# Patient Record
Sex: FEMALE | Race: BLACK OR AFRICAN AMERICAN | Employment: OTHER | URBAN - METROPOLITAN AREA
[De-identification: names, ages, dates, MRNs, and addresses within clinical notes are randomized per-mention and may not be internally consistent; named-entity substitution may affect disease eponyms.]

---

## 2019-07-01 PROBLEM — M54.6 ACUTE MIDLINE THORACIC BACK PAIN: Status: ACTIVE | Noted: 2017-11-13

## 2019-07-01 PROBLEM — F33.1 MODERATE EPISODE OF RECURRENT MAJOR DEPRESSIVE DISORDER (HCC): Status: ACTIVE | Noted: 2017-01-24

## 2019-07-01 PROBLEM — I95.0 IDIOPATHIC HYPOTENSION: Status: ACTIVE | Noted: 2017-01-24

## 2019-07-01 PROBLEM — K21.9 GASTROESOPHAGEAL REFLUX DISEASE WITHOUT ESOPHAGITIS: Status: ACTIVE | Noted: 2017-01-24

## 2019-07-01 PROBLEM — E78.00 HIGH BLOOD CHOLESTEROL: Status: ACTIVE | Noted: 2017-01-24

## 2019-07-01 PROBLEM — R42 INTERMITTENT LIGHTHEADEDNESS: Status: ACTIVE | Noted: 2017-11-13

## 2021-08-12 PROBLEM — H53.8 OTHER VISUAL DISTURBANCES: Status: ACTIVE | Noted: 2021-08-12

## 2022-03-31 PROBLEM — E87.20 METABOLIC ACIDOSIS: Status: ACTIVE | Noted: 2021-10-03

## 2022-03-31 PROBLEM — D50.0 IRON DEFICIENCY ANEMIA DUE TO CHRONIC BLOOD LOSS: Status: ACTIVE | Noted: 2022-03-31

## 2022-03-31 PROBLEM — R13.12 OROPHARYNGEAL DYSPHAGIA: Status: ACTIVE | Noted: 2022-03-31

## 2022-03-31 PROBLEM — R55 SYNCOPE: Status: ACTIVE | Noted: 2021-10-03

## 2022-03-31 PROBLEM — T73.0XXA STARVATION KETOACIDOSIS: Status: ACTIVE | Noted: 2021-10-03

## 2022-03-31 PROBLEM — K31.84 GASTROPARESIS: Status: ACTIVE | Noted: 2022-03-31

## 2022-03-31 PROBLEM — K59.00 CONSTIPATION: Status: ACTIVE | Noted: 2022-03-31

## 2022-03-31 PROBLEM — R42 ORTHOSTATIC DIZZINESS: Status: ACTIVE | Noted: 2017-11-13

## 2022-03-31 PROBLEM — D50.9 IRON DEFICIENCY ANEMIA: Status: ACTIVE | Noted: 2022-03-31

## 2022-03-31 PROBLEM — R14.3 FLATULENCE, ERUCTATION AND GAS PAIN: Status: ACTIVE | Noted: 2022-03-31

## 2022-03-31 PROBLEM — E87.29 STARVATION KETOACIDOSIS: Status: ACTIVE | Noted: 2021-10-03

## 2022-03-31 PROBLEM — R55 NEAR SYNCOPE: Status: ACTIVE | Noted: 2021-10-03

## 2022-03-31 PROBLEM — K57.30 DIVERTICULAR DISEASE OF COLON: Status: ACTIVE | Noted: 2022-03-31

## 2022-03-31 PROBLEM — R14.1 FLATULENCE, ERUCTATION AND GAS PAIN: Status: ACTIVE | Noted: 2022-03-31

## 2022-03-31 PROBLEM — R14.2 FLATULENCE, ERUCTATION AND GAS PAIN: Status: ACTIVE | Noted: 2022-03-31

## 2022-04-20 ENCOUNTER — HOSPITAL ENCOUNTER (OUTPATIENT)
Dept: PHYSICAL THERAPY | Age: 42
Discharge: HOME | End: 2022-04-20
Payer: MEDICARE

## 2022-04-20 PROCEDURE — 97162 PT EVAL MOD COMPLEX 30 MIN: CPT

## 2022-04-20 NOTE — PROGRESS NOTES
[facility] PHYSICAL THERAPY  [address] [place] [address] Mary Parrish, [address] - Phone: [phone]  Fax: [phone] / [facility]  Patient Name: Wing Cox : 1980   Medical   Diagnosis: Gait instability [R26.81]  Multiple sclerosis [G35] Treatment Diagnosis: MS   Onset Date: chronic     Referral Source: Matthew Miller NP Start of Care [facility] [place]): 2022   Prior Hospitalization: See medical history Provider #: [id number]   Prior Level of Function: Ambulating with SPC   Comorbidities: anxiety, depression, back pain, IBS, MS, insomnia, migraines   Medications: Verified on Patient Summary List   The Plan of Care and following information is based on the information from the initial evaluation.   ===========================================================================================  Assessment / key information:  Patient is a 35y.o. year old female with chief complaint of decreased functional mobility due to weakness, fatigue, imbalance, and lightheadedness. Patient reports no recent falls, but she does report \"skipping steps\" while walking (indicates side stepping and decreased stride). Objective findings: 1) Functional Gait Assessment (FGA) not assessed today due to time constraints (this will be completed next visit), 2) increased base of support noted during gait with SPC, 3) decreased strength in B LE's (primarily hip musculature), 4) Five Times Sit to Stand test = 19\", indicating decreased functional mobility, 5) gait speed = 1.0 m/s (WNL). Patient will benefit from skilled PT services to address these issues. Thank you for your referral.    Hayes Leong (Focused on Therapeutic Outcomes) Functional Status score = 51/100, which corresponds to a functional limitation of 49%.     Strength (MMT):  Shoulder L (0-5) R (0-5)   Shoulder Flexion 4 4   Shoulder Abduction 4 4   Shoulder IR 5 5   Shoulder ER 4 4 Elbow flexion 4 4   Elbow extension 4 4                                             Hip L (0-5) R (0-5)   Hip Flexion 5 5   Hip Ext 3+ 3   Hip ABD 3 3   Hip ADD       Hip ER       Hip IR          Knee L (0-5) R (0-5)   Knee Flexion 5 5   Knee Extension 5 5       ===========================================================================================  Eval Complexity: History: HIGH Complexity :3+ comorbidities / personal factors will impact the outcome/ POC Exam:MEDIUM Complexity : 3 Standardized tests and measures addressing body structure, function, activity limitation and / or participation in recreation  Presentation: MEDIUM Complexity : Evolving with changing characteristics  Clinical Decision Making:MEDIUM Complexity : FOTO score of 26-74Overall Complexity:MEDIUM    Problem List: pain affecting function, decrease strength, impaired gait/ balance, decrease ADL/ functional abilitiies, decrease activity tolerance, decrease flexibility/ joint mobility and decrease transfer abilities   Treatment Plan may include any combination of the following: Therapeutic exercise, Therapeutic activities, Neuromuscular re-education, Physical agent/modality, Gait/balance training, Manual therapy and Patient education  Patient / Family readiness to learn indicated by: asking questions, trying to perform skills and interest  Persons(s) to be included in education: patient (P) and family support person (FSP);list mother  Barriers to Learning/Limitations: None  Measures taken:    Patient Goal (s): \"get better\"   Patient self reported health status: fair  Rehabilitation Potential: good   Short Term Goals: To be accomplished in  4  weeks:  1. Patient will be compliant with home exercise program.  2.  Complete FGA to assess fall risk. 3.  Complete Dodson Balance Scale to assess fall risk.  Long Term Goals: To be accomplished in  8  weeks:  1.   Patient will be independent with home exercise program.  2.  Increase FGA score by 4 points, if applicable, to decrease fall risk. 3.  Increase Dodson score by 6 points, if applicable, to decrease fall risk. 4.  Patient will increase FOTO Functional Status score to > 54/100 to indicate decreased functional limitations. Frequency / Duration:   Patient to be seen  2-3  times per week for 8  weeks:  Patient / Caregiver education and instruction: self care    Therapist Signature: Bry Kemp PT Date: 5/23/9477   Certification Period: 4/20/2022 - 7/19/2022 Time: 6:31 PM   ===========================================================================================  I certify that the above Physical Therapy Services are being furnished while the patient is under my care. I agree with the treatment plan and certify that this therapy is necessary. Physician Signature:        Date:       Time:                                         Kristin Marks NP    Please sign and return to [facility] or you may fax the signed copy to [phone]. Thank you. To ensure your patient receives the highest quality care and to avoid disruption in therapy please sign and return this plan of care within 21 days. Per Medicaid guidelines if the plan of care is not received within 21 days the patient's care must be put on hold until signed.

## 2022-05-02 ENCOUNTER — HOSPITAL ENCOUNTER (OUTPATIENT)
Dept: PHYSICAL THERAPY | Age: 42
Discharge: HOME | End: 2022-05-02
Payer: MEDICARE

## 2022-05-02 PROCEDURE — 92610 EVALUATE SWALLOWING FUNCTION: CPT

## 2022-05-02 PROCEDURE — 97530 THERAPEUTIC ACTIVITIES: CPT

## 2022-05-02 PROCEDURE — 97116 GAIT TRAINING THERAPY: CPT

## 2022-05-02 PROCEDURE — 97112 NEUROMUSCULAR REEDUCATION: CPT

## 2022-05-02 NOTE — PROGRESS NOTES
ST DAILY TREATMENT NOTE    Patient Name: Araceli Garibay  Date:2022  : 1980  [x]  Patient  Verified  Payor: Sara Muirward / Plan: [place] / Product Type: Managed Care Medicare /   In time: 2:05  Out time: 2:45  Total Treatment Time (min): 40  Visit #: 1 of 8    SUBJECTIVE  Pain Level (0-10 scale): 0  Any medication changes, allergies to medications, adverse drug reactions, diagnosis change, or new procedure performed?: [x] No    [] Yes (see summary sheet for update)  Subjective functional status/changes:   [] No changes reported  This 34 y/o female was referred to OP ST d/t reported \"difficulty chewing\" with a known hx of MS since the age of 14. . Pt denies coughing with meals or any recent PNAs. She does reports difficulty chewing (more dominant with L side)  with both sides of her mouth. She further reports occasionally finding pocketed food within her oral cavity when brushing her teeth. She exhibits notable cognitive deficits and slurred/rapid speech in conversation. Date of Onset: referred 22  Social History:  ; mother of a son; resides with her mother who is her caregiver. Prior Functional level: All aspects of speech/ language, cognition, voice and swallowing WNLs, per pt report  Radiology: MBS 16 which revealed oral pharyngeal swallowing skills were [place]/[facility]. Mildly delayed swallow initiation with thin liquids and mixed consistency reaching pyriforms prior to swallow initiation. No laryngeal penetration, aspiration, or pharyngeal residual across trials     MBS 2020: which revealed oral pharyngeal swallowing skills were WNL. Timely swallow initiation with adequate tongue base retraction, complete epiglottic inversion, and sufficient hyolaryngeal elevation. No aspiration or penetration was observed with trials of thin liquid, puree, soft solid and solid consistencies during this MBSS.      Current diet: Regular with thin liquids   positioning: 90*    Mental Status:  [x]alert []lethargic [x]confused  Orientation: [x]person [x]place []time []situation  AC Directions: [x]1-step [x]2-step []3-step []complex  Motivation: []excellent [x]good  []fair  []poor   Barriers to learning: []none []aphasia [x]? cognition []lethargy/motivation [][place]   []?vision []language [x]Fatigue/pain []psych factors compensate with:   Dentition: [x]normal []abnormal []edentulous []dentures   Respiratory Status: [x]WFL []SOB  []O2 L/min:  []NC []Mask               []Trach Tube:                     []Excess secretions  Lips:  [x]Symmetrical []asymmetrical  Retraction [x]WFL  []?min []?mod []?max  Protrusion [x]WFL  []?min []?mod []?max  Strength [x]WFL  []?min []?mod []?max  Puff  []WFL  [x]?min []?mod []?max  minimallu fasiculations    Tongue:  []Symmetrical [x]asymmetrical  Protrusion []WFL  [x]?min [x]?mod []?max  Deviates to the R with fasicualtions in tongue and upper lip  Elevation []WFL  [x]?min [x]?mod []?max  Depression [x]WFL  []?min []?mod []?max  Lateralization [x]WFL  []?min []?mod []?max  Strength []WFL  [x]?min [x]?mod []?max    Velum:  []Symmetrical []asymmetrical    Gag Reflex:  [x]Present []absent []DNT    Sensation:  [x]Intact []Diminished  Specify: n/a    Voice:  []Normal []Hoarse []harsh  [x]Breathy []Hypernasal []hyponasal  [][place] Other: perceptually soft. Swallow:  [x]Volitional []absent  [x]Reflexive []absent  Cough   Strength : []WNL []Diminished  [x]Volitional []absent  []Reflexive []absent  Did not witness  OBJECTIVE    OP SWALLOW EVALUATION    Observation of Swallow:  Thin Nectar Honey Puree  applesauce Solids  Mechanical soft fruit and grain bar/regular peanut butter cracker nabs Other  Mixed consistency canned fruit cocktail   Oral Phase  n/a n/a   n/a   Anterior loss of bolus  (decreased labial seal [])         Decreased bolus formation  (?lingual ROM/Coord[x]) X    X    Increased mastication     X    Increased oral transit time  (?A-P bolus transit[x])    X X    Abnormal chewing     X    Tongue pumping/tremors         Pocketing  (?labial/buccal tension/lingual control)     Reported, but not witnessed    Other         Oral Pharyngeal Phase         Delayed swallow initiation         Absent swallow         Stasis on lingual surface  (?lingual movement)         Adherence to hard palate   (? lingual elevation)         Pharyngeal Phase         Multiple swallows  (residue in pharynx [])         Coughing post swallow         Throat clear post swallow         Wet vocal quality  (residue on vocal cords [])         Reduced laryngeal elevation         Nasal Regurgitation  (? velopharyngeal closure)         Other           [] No symptoms of dysphagia evidenced  [x] Symptoms of dysphagia observed  [x] Patient at risk for aspiration  [] Other:      Today, SLP conducted a Clinical Beside Swallow Evaluation, per MD orders. Pt A&Ox2-3 loosely and able to follow commands. Oral [facility] examination revealed structures functional for speech and deglutition, but with noted reduced lingual strength and coordination. Pt observed with thin liquids +/- straw via single sips and successive swallows with timely swallow initiation, adequate laryngeal elevation to palpation and no overt s/sx aspiration; pt reports she prefers drinking from a straw as it makes her \"less likely to choke\". Pt demo'd acceptance of puree with slow, but functional oral prep. Abnormal masticatory pattern observed with L sided dominance and extended time warranted for A-P transit. Pt given min cues to [facility] and with her entire mouth. No apparent oral residuals post swallow, however, pt reported recently finding food within her mouth when brushing her teeth. No overt s/sx of aspiration witnessed. Pt further witnessed accepting her 1 pm medications that she reported she forgot to take earlier with slow oral manipulation and A-P transi, but 0 s/sx of aspiration.  Pt reported recent weight loss of ~ 18 lbs, but unable to recall within what timeframe. Impression:   Pt exhibits a mild oral dysphagia, putting her at risk for aspiration. Additionally, noted underlying cognitive-linguistic deficits and motor speech deficit. Would further rec a speech evaluation. SLP RECS:  Regular solid diet (as tolerated) with thin liquids, meds as tolerated,  with oral care TID and standard aspiration precautions. ST will continue to follow. Pt educated with regard to s/sx aspiration, aspiration risk, comp strategies, diet recs and role of SLP. Handout provided.   Pt agreeable    Recommendations:  Diet:  [] NPO    [] Pureed [] Ground [] MechSoft [x] Regular (as tolerated   Liquids: [x] Thin  [] Nectar  [] Honeythick  [] Pudding  Presentation: [x] Cup    [] Spoon [x] Straw (preferred) [x] Alternate liquids and solids  Monitor: [x] Sitting up at 90 deg [] Reclined to:  [] Head turned to:    [] Chin tuck  [] Head tilt to:      [x] Seated upright post meals (min): 30-45  Document: [x] Coughing [x] Temperatures [] Lung Sounds    Videoflouroscopy: [] Yes [x] No  Dysphagia Treatment: [x] Yes [] No Sessions per week: 1-2x  Other: Rec speech/cognitive eval     Remediation Techniques:  C = Compensatory techniques to use during meal      F = Facilitation/treatment techniques by SLP    [] Supraglottic Swallow (c,f)    [x] Oral motor exercises (f)  [] Super-supraglottic swallow (c,f)   [x] Labial closure  [x] Compensations for pocketing (c)   [x] Lingual elevation  [x] Sweep mouth with tongue    [] Lingual lateralization  [] Sweep mouth with finger    [x] Lingual anterior-posterior  [] External pressure to check   [x] Lingual base of tongue  [x] Rinse mouth/expel after meal   [] Vocal Fold Exercises (f)  [x] Alternate  Solid::liquid swallows every 2-3 bites (c)    [] Falsetto/laryngeal elevation exercises (f)  [] Discourage liquid wash between bites (c)  [] Thermal application (c,f)  [] Multiple swallows     [] Sour bolus (f)  [x] Patient needs cues     [] Cold bolus (f)  [] Patient does not need cues   [] Pharyngeal exercise (f)  [] Arneta Kales (c,f)    [] Breath hold  [] Encourage/stimulate lip closure (c)   [x] Effortful Swallow (c,f)  [] Empty mouth before next bite (c)   [x] Tongue base retraction  [] Cue patient to slow down (c)    [x] Tongue hold  [] Encourage coughing (c)    [] Laryngeal closure  []Chin tuck (c)  []Head turn(c)  []Head turn, chin tuck (c)    Patient/Caregiver instruction/education: [x] Review HEP    [] Progressed/Changed    HEP/Handouts given: Diet recs and comp strategies    Pain Level (0-10 scale) post treatment: 0    ASSESSMENT  [x]  See Plan of Care     PLAN  [x]  Upgrade activities as tolerated     [x]  Continue plan of care  []  Discharge due to:__  [x] Other: teaching session provided.      Khanh Oconnell, SLP 5/2/2022  12:36 PM  MA, [address] [facility]  Speech-Language Pathologist

## 2022-05-02 NOTE — PROGRESS NOTES
[facility]  [address]  Ph: [phone]  Fax: [phone]    Plan of Care/ Statement of Necessity for Speech Therapy Services    Patient name: Lucky Blizzard Start of Care: 2022   Referral source: Dory Dash NP : 1980    Medical Diagnosis: Dysphagia, oral phase [R13.11]  Chewing difficulty [K08.89]   Onset Date:referred  22     Treatment Diagnosis: R13.11   Prior Hospitalization: see medical history Provider#: [id number]   Medications: Verified on Patient summary List    Comorbidities: Central Pain Syndrome; MS; chronic pain syndrome; neuralgia; neurogenic bladder; multiple joint pain; GERD; idiopathic hypotension; major depressive disorder; visyual disturbances; metabolic acidosis; orthostatic dizziness; intermittent lightheadedness; syncope; starvation ketoacidosis; diverticular dieases of colon; gastroparesis; Fe def anemia; oropharyngeal dysphagia; flatulence; constipation   Prior Level of Function: All aspects of speech/ language, cognition, voice and swallowing WNLs, per pt report; per chart review: noted previous MBSS    The Plan of Care and following information is based on the information from the initial evaluation. Assessment/ key information: This 36 y/o female was referred to OP ST d/t reported \"difficulty chewing\" with a known hx of MS since the age of 14. . Pt denies coughing with meals or any recent PNAs. She does reports difficulty chewing (more dominant with L side)  with both sides of her mouth. She further reports occasionally finding pocketed food within her oral cavity when brushing her teeth. She exhibits notable cognitive deficits and slurred/rapid speech in conversation. Radiology:  [facility] 16 which revealed oral pharyngeal swallowing skills were [place]/[facility]. Mildly delayed swallow initiation with thin liquids and mixed consistency reaching pyriforms prior to swallow initiation.  No laryngeal penetration, aspiration, or pharyngeal residual across trials    MBS 8/27/2020: which revealed oral pharyngeal swallowing skills were WNL. Timely swallow initiation with adequate tongue base retraction, complete epiglottic inversion, and sufficient hyolaryngeal elevation. No aspiration or penetration was observed with trials of thin liquid, puree, soft solid and solid consistencies during this MBSS. Dysphagia Assessment:   Today, SLP conducted a Clinical Beside Swallow Evaluation, per MD orders. Pt A&Ox2-3 loosely and able to follow commands. Oral [facility] examination revealed structures functional for speech and deglutition, but with noted reduced lingual strength and coordination.  Pt observed with thin liquids +/- straw via single sips and successive swallows with timely swallow initiation, adequate laryngeal elevation to palpation and no overt s/sx aspiration; pt reports she prefers drinking from a straw as it makes her \"less likely to choke\".  Pt demo'd acceptance of puree with slow, but functional oral prep. Abnormal masticatory pattern observed with L sided dominance and extended time warranted for A-P transit. Pt given min cues to [facility] and with her entire mouth. No apparent oral residuals post swallow, however, pt reported recently finding food within her mouth when brushing her teeth. No overt s/sx of aspiration witnessed. Pt further witnessed accepting her 1 pm medications that she reported she forgot to take earlier with slow oral manipulation and A-P transi, but 0 s/sx of aspiration. Pt reported recent weight loss of ~ 18 lbs, but unable to recall within what timeframe. Impression:   Pt exhibits a mild oral dysphagia, putting her at risk for aspiration. Additionally, noted underlying cognitive-linguistic deficits and motor speech deficit.  Would further rec a speech evaluation.      SLP RECS:  Regular solid diet (as tolerated) with thin liquids, meds as tolerated,  with oral care TID and standard aspiration precautions. ST will continue to follow.     Pt educated with regard to s/sx aspiration, aspiration risk, comp strategies, diet recs and role of SLP. Handout provided.  Pt agreeable     Problem List:   Dysphagia,  Impaired Cognition, and Motor Dysarthria    Treatment Plan may include any combination of the following: Dysphagia Treatment and Patient Education      Patient / Family readiness to learn indicated by: asking questions, trying to perform skills and interest    Persons(s) to be included in education:   patient (P) and family support person (FSP);list Zay Gong - mother    Barriers to Learning/Limitations: yes;  cognitive, sensory deficits-vision/hearing/speech and physical    Patient Goal (s): Chew better    Patient Self Reported Health Status: fair    Rehabilitation Potential: good    Short Term Goals: To be accomplished in 4 weeks  1. Pt will complete OMEs and tongue base retraction exs x15 reps to improve oral efficiency, strength, and coordination warranted for improve oral phase with min cues. 2. Pt will recall/demo aspiration precautions and safe swallowing strategies with 100% acc with amandeep from visual aid in 3/3 sessions (Sit upright at 90 degree angle during po trials and for 30 minute post po intake; small sips/bites; effortful swallow; alternate liquids/solids; slow rate; oral care 2-3x daily) to decrease the reported incidences of dysphagia and reduce risk of aspiration. 3. Pt will tolerate trials of regular, puree, mixed consistency, and thin liquids trials  using comp strategies without s/sx of aspiration/ penetration given amandeep with visual aid A in 3/3 sessions. 4. Pt will demonstrate a more coordinate and efficient rotary chew with adequate bolus formation/manipulation, and complete oral clearance in 3/3 sessions when provided with amandeep with visual aid to promote safe po intake and QOL.      5. Pt will complete a speech/cognitive-linguistic evaluation pending MD orders to establish baseline and most appropriate POC. Long Term Goals: To be accomplished in 4 weeks:     1. Patient will consume a least restrictive diet utilizing compensatory swallowing strategies with amandeep in 4/5 trials without s/s of aspiration/ penetration to promote QOL, enhance hydration/ nutrition status, and reduce risk of aspiration. Frequency / Duration: Patient to be seen 1-2 times per week for 4 weeks:    Patient/ Caregiver education and instruction: Diagnosis, prognosis,  Swallowing Precautions and Compensatory Techniques. Certification Period: 5/2/22-5/30/22    MIGUEL Guerrero 5/2/2022 12:37 PM  MA, [facility]-SLP  Speech-Language Pathologist    ______________________________________________________________________    I certify that the above Therapy Services are being furnished while the patient is under my care. I agree with the treatment plan and certify that this therapy is necessary. Physician's Signature:____________________  Date:___________Time:_________    Please sign and return to [facility].   Ph: [phone]  Fax: [phone]

## 2022-05-05 ENCOUNTER — HOSPITAL ENCOUNTER (OUTPATIENT)
Dept: PHYSICAL THERAPY | Age: 42
Discharge: HOME | End: 2022-05-05
Payer: MEDICARE

## 2022-05-05 PROCEDURE — 97116 GAIT TRAINING THERAPY: CPT

## 2022-05-05 PROCEDURE — 97110 THERAPEUTIC EXERCISES: CPT

## 2022-05-05 PROCEDURE — 97112 NEUROMUSCULAR REEDUCATION: CPT

## 2022-05-05 PROCEDURE — 97530 THERAPEUTIC ACTIVITIES: CPT

## 2022-05-05 NOTE — PROGRESS NOTES
PT/OT/ST NEURO ROUNDS    Patient Name: Kaykay Lainez        Date: 2022  : 1980   YES Patient  Verified  Referring Practitioner: Carloz Tom NP    PT: # of visits to date: 2  Low FGA and Dodson scores - will focus on balance, hip strength. OT: # of visits to date: NA  NA    ST: # of visits to date: 1  Swallow eval performed. Working on oral strength, bolus manipulation/formation.   Requested order for speech and cognitive eval.    Therapists Present:    Caitlyn Dinero, MARIA DEL ROSARIO Trejo, MPT  Ryan Sportsman, OTR/L  [place], [place], [address] [facility]

## 2022-05-05 NOTE — PROGRESS NOTES
PHYSICAL THERAPY - DAILY TREATMENT NOTE    Patient Name: Jerod Hyde        Date: 2022  : 1980   YES Patient  Verified  Visit #:   3     Insurance: Payor: Abbey Seo / Plan: [place] / Product Type: Managed Care Medicare /      In time: 2:04 Out time: 2:45   Total Treatment Time: 41     Medicare/BCBS [place] Time Tracking (below)   Total Timed Codes (min):  41 1:1 Treatment Time:  41     TREATMENT AREA =  Gait instability [R26.81]  Multiple sclerosis [G35]    SUBJECTIVE    Pain Level (on 0 to 10 scale):  0  / 10   Medication Changes/New allergies or changes in medical history, any new surgeries or procedures?    no    If yes, update Summary List   Subjective Functional Status/Changes:  []  No changes reported     \"My legs will hurt sometimes if I just walk a short distance. \"   Patient reports compliance with HEP. OBJECTIVE    13 min Therapeutic Exercise:  [x]  See flow sheet   Rationale:      increase ROM, increase strength, improve coordination, improve balance, and increase proprioception to improve the patients ability to perform ADL's, gait, and functional mobility with increased safety and independence. 8 min Therapeutic Activity:    Rationale:      increase ROM, increase strength, improve coordination, improve balance, and increase proprioception to improve the patients ability to perform ADL's, gait, and functional mobility with increased safety and independence. 12 min Neuromuscular Re-ed: See flowsheet   Rationale:      increase ROM, increase strength, improve coordination, improve balance, and increase proprioception to improve the patients ability to perfom ADL's, gait, and functional mobility with increased safety and independence. 8 min Gait Training: See flowsheet   Rationale:    Improve safety with household and community ambulation.      min Patient Education:  YES  Reviewed HEP   []  Progressed/Changed HEP based on:   Advised patient to perform balance exercises in corner or with kitchen counter behind her with walker (locked) in front of her     Other Objective/Functional Measures: Added several exercises per flowsheet. Patient required verbal cues, tactile cues, and demonstration for correct form with standing hip 3 way. Post Treatment Pain Level (on 0 to 10) scale:   0  / 10     ASSESSMENT    Assessment/Changes in Function:     Patient required 2-3 seated rest breaks through treatment. Patient demonstrated decreased coordination with UE movements during supine scap stabs. []  See Progress Note/Recertification   Patient will continue to benefit from skilled PT services to modify and progress therapeutic interventions, address functional mobility deficits, address ROM deficits, address strength deficits, analyze and address soft tissue restrictions, analyze and cue movement patterns, analyze and modify body mechanics/ergonomics, assess and modify postural abnormalities, address imbalance/dizziness and instruct in home and community integration to attain remaining goals. Progress toward goals / Updated goals: · Short Term Goals: To be accomplished in  4  weeks:  1. Patient will be compliant with home exercise program. Reports compliance with HEP  2. Complete FGA to assess fall risk. MET last visit  3. Complete Dodson Balance Scale to assess fall risk.  MET last visit       PLAN    [x]  Upgrade activities as tolerated YES Continue plan of care   []  Discharge due to :    []  Other:      Therapist: Dixon Tariq PT    Date: 5/5/2022 Time: 2:13 PM     Future Appointments   Date Time Provider Tawny Shelton   5/9/2022  3:30 PM MIGUEL Hoover [facility]   5/10/2022 11:00 AM Dell Rivera PT [facility]   5/16/2022  3:30 PM MIGUEL Hoover [facility]   5/19/2022 11:00 AM Hammad Espinosa PT [facility]   5/23/2022 12:30 PM Hammad Espinosa PT [facility]   5/23/2022  3:30 PM Maude Lewis, SLP [facility]   5/26/2022 11:45 AM MIGUEL Cochran [facility]   5/26/2022 12:30 PM Pamela López, PT [facility]

## 2022-05-09 ENCOUNTER — HOSPITAL ENCOUNTER (OUTPATIENT)
Dept: PHYSICAL THERAPY | Age: 42
Discharge: HOME | End: 2022-05-09
Payer: MEDICARE

## 2022-05-09 PROCEDURE — 92523 SPEECH SOUND LANG COMPREHEN: CPT

## 2022-05-09 NOTE — PROGRESS NOTES
ST DAILY TREATMENT NOTE    Patient Name: Araceli Garibay  Date:2022  : 1980  [x]  Patient  Verified  Payor: Sara Weinberg / Plan: [email or web address] / Product Type: Managed Care Medicare /   In time: 26  Out time:420  Total Treatment Time (min): 45  Visit #: 1 of 8    SUBJECTIVE  Pain Level (0-10 scale): 0  Any medication changes, allergies to medications, adverse drug reactions, diagnosis change, or new procedure performed?: [x] No    [] Yes (see summary sheet for update)  Subjective functional status/changes:   [] No changes reported  NP faxed signed order for consent to complete motor speech/cognitive eval   Date of Onset: referred 22  Social History:  ; mother of a son; resides with her mother who is her caregiver. Prior Functional level: All aspects of speech/ language, cognition, voice and swallowing WNLs, per pt report  Radiology: n/a  OBJECTIVE    OUTPATIENT SPEECH-LANGUAGE EVALUATION  The Brief Cognitive Assessment Tool (BCAT) was administered. The instrument assesses orientation, verbal recall, visual recognition, visual recall, attention, abstraction, language, executive functions, visuospacial processing in adults and older adult population. Results of the sub-tests: scored as correct/total possible. Orientation: Patient was oriented to person, month, day of week, city, state, and situation: /6  Immediate verbal memory: 4/4 words; delayed verbal memory: 0/4 words  (+ 2 given modA)    Visual recognition/naming 3 common objects: 3/3; delayed visual recall 3/3.    Attention: tap when he heard a certain letter called out:   Mental control: count backwards 20-1:  Days of week backwards:   digits forward 2/2; digits backwards:   Abstraction: tell the similarities: 3/3   Language: repeat a sentence   Fluency: list girls names: 2  Executive: cognitive shiftin/2  Arithmetic reasonin/1  Judgement: (how much time to a lot to get to an appointment): 1/1  Visuospatial; design: 2/2 Clock 2/2  Immediate story recall: 6/11 facts recalled: 1/2 Delayed story recall: 3/11 0/2  Story recognition: 3/5    Patient attained a score of 39 points out of a total of 50, indicating mild-moderate cognitive deficits with immediate and delayed recall, mental flexibility.  -Overall her delayed recall was mod-severely impaired.  -Immediate memory wasmild-moderately impaired  -mental flexibility was mildly impaired      Portions of the Frenchay Dysarthria Assessment 2nd Edition (FDA-2) was administered. Results are scored by a rating scale form from an A (normal function) to an E (no function) by 7 sections, including:  Reflexes, Respiration, lips, Palate, Laryngeal, Tongue, and Intelligibility. Results are as indicated:    Reflexes- Cough A, Swallow B Dribble/Drool A  Respiration: At Rest A , At Speech C  Lips: At Rest  B Spread B, Seal A, Alternate A ( 9.4), In speech B  Palate: Fluids A, Maintenance A, In-Speech A   Laryngeal: Time A (25.9s) , Pitch DNT, Volume  DNT,  In speech B-C  Tongue: At rest: B, Protrusion: A (3.4s), Elevation: C 12.5 with reps , Lateral: 3.3s A, Alternate: B 6.9s , In Speech: B   Intelligibility: Words: A-B, Sentences: B, Conversation: B    A= Normal for age, B = Mild abnormality noticeable to skilled observer, C= Abnormality obvious but can perform task/movements with reasonable approximation, D= Some production of task but poor in quality, unable to sustain, inaccurate or extremely labored, E= Unable to undertake/ movement/ sound  Overall pt was ~ 80-90% intelligible, however did intermittently exhibited garbles/slurred speech with noted distortion and interrupted rhythm, rhyme, prosody. He VQ intermittently fell to a back throat focus.  She displayed significantly reduced coordination with lingual elevation/depression and overall reduced strength for lingual elevation            Receptive Language:  Receptive vocabulary    [x] WNL    [] Impaired [] Mild [] Mod [] Severe  Reliability of yes/no responses to questions [x] WNL    [] Impaired [] Mild [] Mod [] Severe   Reliability of responses to complex ideation [x] WNL    [] Impaired [] Mild [] Mod [] Severe  Auditory retention/processing   [x] WNL    [] Impaired [] Mild [] Mod [] Severe  Follow commands [] 1 Step [] 2 Step [x] 3 Step [] complex  Objective Information:    Expressive Language  Automatic speech   [x] WNL    [] Impaired [] Mild [] Mod [] Severe  Able to identify objects/pictures  [x] WNL    [] Impaired [] Mild [] Mod [] Severe  Preservations    [] WNL    [x] Impaired [x] Mild [] Mod [] Severe  Word retrieval    [x] WNL    [] Impaired [] Mild [] Mod [] Severe  Paraphasias   [x]None[] Literal    [] Phenomic   [] Jargon   [] Semantic  Able to make needs known at level [] Word   [] Phrase   [x] Sentence   [] Gesture        [] Unable   Objective Information:    Writing: [] L or [x] R [x] WNL    [] Impaired @ [] Word [] Sentence [] Paragraph    Reading:  [x] WNL    [] Impaired @ [] Word [] Sentence [] Paragraph    Cognition:  Attention: [x] Alert    [] Drowsy  Orientation: [x] Person   [x] Place    [x] Time  Memory: [] WNL    [x] Impaired ST   [] Impaired LT  Comments:    Executive Functions:  Problem Solving: [] WNL     [] Impaired [] Mild [] Mod [] Severe  Neglect:  [x] None    [] Left   [] Right  Self-regulation  [x] Appropriate   [] Impulsive   [] Requires verbal cues  Awareness:  [] Poor initiation   [] Disinhibition   [] Constant supervision        Speech:  Oral Verbal Apraxia: [x] None    [] Mild    [] Moderate    [] Severe   Dysarthria:  [] None    [x] Mild    [] Moderate    [] Severe   Intelligibility:  [] WNL    [] Words   [] Phrases   [x] Sentences   [x] Conversation      % Intelligible: 80-90  Voice:   [x] WNL    [] Hoarse   [] Breathy   Comments: intermittently tone placement dropped  Fluency:  [x] WNL    [] Dysfluent:    Patient/Caregiver instruction/education: [x] Review HEP [] Progressed/Changed    HEP/Handouts given: Memory strategies     Pain Level (0-10 scale) post treatment: 0    ASSESSMENT  [x]  See Plan of Care     PLAN  []  Upgrade activities as tolerated     [x]  Continue plan of care  []  Discharge due to:__  [x] Other: Tx initiated pt educ implemented comp strategies for [place] [facility], SLP 5/9/2022  3:40 PM  MA, [id number] [facility]  Speech-Language Pathologist No significant past surgical history

## 2022-05-09 NOTE — PROGRESS NOTES
[facility]  [address]  Ph: [phone]  Fax: [phone]    Plan of Care/ Statement of Necessity for Speech Therapy Services    Patient name: Wiliam Odne Start of Care: 2022   Referral source: Homero Rolle NP : 1980    Medical Diagnosis: Dysphagia, oral phase [R13.11]  Chewing difficulty [K08.89]   Onset Date:referred 22   Treatment Diagnosis: R41.841; R47.1; R13.11   Prior Hospitalization: see medical history Provider#: [id number]   Medications: Verified on Patient summary List    Comorbidities: Central Pain Syndrome; MS; chronic pain syndrome; neuralgia; neurogenic bladder; multiple joint pain; GERD; idiopathic hypotension; major depressive disorder; visyual disturbances; metabolic acidosis; orthostatic dizziness; intermittent lightheadedness; syncope; starvation ketoacidosis; diverticular dieases of colon; gastroparesis; Fe def anemia; oropharyngeal dysphagia; flatulence; constipation   Prior Level of Function: All aspects of speech/ language, cognition, voice and swallowing WNLs, per pt report; per chart review: noted previous MBSS    The Plan of Care and following information is based on the information from the initial evaluation. Assessment/ key information: This 36 y/o female was referred to OP ST d/t reported \"difficulty chewing\" with a known hx of MS since the age of 14. Eval completed on 22 with noted cognitive deficits and slurred/rapid speech in conversation. Orders obtained to further eval motor speech and cognition in which we will address this date.      OUTPATIENT SPEECH-LANGUAGE EVALUATION  The Brief Cognitive Assessment Tool (BCAT) was administered. The instrument assesses orientation, verbal recall, visual recognition, visual recall, attention, abstraction, language, executive functions, visuospacial processing in adults and older adult population.  Results of the sub-tests: scored as correct/total possible.         Orientation: Patient was oriented to person, month, day of week, city, state, and situation:   Immediate verbal memory: 4/4 words; delayed verbal memory: 0/4 words  (+ 2 given modA)    Visual recognition/naming 3 common objects: 3/3; delayed visual recall 3/3. Attention: tap when he heard a certain letter called out:   Mental control: count backwards 20-1:  Days of week backwards:   digits forward ; digits backwards:   Abstraction: tell the similarities: 3/3   Language: repeat a sentence   Fluency: list girls names:   Executive: cognitive shiftin/2  Arithmetic reasonin/1  Judgement: (how much time to a lot to get to an appointment):   Visuospatial; design:  Clock   Immediate story recall:  facts recalled:  Delayed story recall: 3/11 0/2  Story recognition: 3/5     Patient attained a score of 39 points out of a total of 50, indicating mild-moderate cognitive deficits with immediate and delayed recall, mental flexibility.  -Overall her delayed recall was mod-severely impaired.  -Immediate memory wasmild-moderately impaired  -mental flexibility was mildly impaired        Portions of the Frenchay Dysarthria Assessment 2nd Edition (FDA-2) was administered. Results are scored by a rating scale form from an A (normal function) to an E (no function) by 7 sections, including:  Reflexes, Respiration, lips, Palate, Laryngeal, Tongue, and Intelligibility. Results are as indicated:     Reflexes- Cough A, Swallow B Dribble/Drool A  Respiration: At Rest A , At Speech C  Lips: At Rest  B Spread B, Seal A, Alternate A ( 9.4), In speech B  Palate: Fluids A, Maintenance A, In-Speech A   Laryngeal: Time A (25.9s) , Pitch DNT, Volume  DNT,  In speech B-C  Tongue:  At rest: B, Protrusion: A (3.4s), Elevation: C 12.5 with reps , Lateral: 3.3s A, Alternate: B 6.9s , In Speech: B   Intelligibility: Words: A-B, Sentences: B, Conversation: B     A= Normal for age, B = Mild abnormality noticeable to skilled observer, C= Abnormality obvious but can perform task/movements with reasonable approximation, D= Some production of task but poor in quality, unable to sustain, inaccurate or extremely labored, E= Unable to undertake/ movement/ sound  Overall pt was ~ 80-90% intelligible, however did intermittently exhibited garbles/slurred speech with noted distortion and interrupted rhythm, rhyme, prosody. Speech was often rapid in natrure. Her VQ intermittently fell to a back throat focus. She displayed significantly reduced coordination with lingual elevation/depression and overall reduced strength for lingual elevation       Impression: Mild-moderate cognitive linguistic deficits and Mild dysarthria    Therapeutic Intervention: Following eval today, SLP educated pt on comp strategies for STM  Problem List:   Dysarthria, Dysphagia and Impaired Cognition    Treatment Plan may include any combination of the following: Cognitive/Language Treatment, Oral Motor Therapeutic Excerise, Dysarthria Treatment, Dysphagia Treatment and Patient Education      Patient / Family readiness to learn indicated by: asking questions, trying to perform skills and interest    Persons(s) to be included in education:   patient (P) and family support person (FSP);list mother    Barriers to Learning/Limitations: yes;  sensory deficits-vision/hearing/speech    Patient Goal (s): improve memory and speak clearer    Patient Self Reported Health Status: fair    Rehabilitation Potential: good    Short Term Goals: To be accomplished in 4 weeks  1. Pt will complete OMEs and tongue base retraction exs x15 reps to improve oral efficiency, strength, and coordination warranted for improve oral phase with min cues. - merged with speech goals. d/c goal     1.  Pt will recall/demo aspiration precautions and safe swallowing strategies with 100% acc with amandeep from visual aid in 3/3 sessions (Sit upright at 90 degree angle during po trials and for 30 minute post po intake; small sips/bites; effortful swallow; alternate liquids/solids; slow rate; oral care 2-3x daily) to decrease the reported incidences of dysphagia and reduce risk of aspiration. New goal as of 5/2/22/ continue addressing      2. Pt will tolerate trials of regular, puree, mixed consistency, and thin liquids trials  using comp strategies without s/sx of aspiration/ penetration given amandeep with visual aid A in 3/3 sessions. New goal as of 5/2/22/ continue addressing        3. Pt will demonstrate a more coordinate and efficient rotary chew with adequate bolus formation/manipulation, and complete oral clearance in 3/3 sessions when provided with amandeep with visual aid to promote safe po intake and QOL. New goal as of 5/2/22/ continue addressing      4. Pt will complete a speech/cognitive-linguistic evaluation pending MD orders to establish baseline and most appropriate POC- met d/c.      Speech Goals:  1)  Pt will recall x3 comp strategies and apply them as deemed appropriate in order to complete delayed recall tasks (novel information and 3-4+ items) presented verbally/visually given a  5-10 minute delay with 90% acc given min cues to improve STM for safety and independence. - newly established on 5/9/22    2)  Pt will completed immediate recall tasks (words 3-5, digits3-5, novel recall 2-4 sentences, etc)  with 80% acc given min cues and use of comp strategies in various structured tasks to improve safety, independence and communication competence.  - newly established on 5/9/22    3) Pt will complete mental flexibility tasks with 90% acc given Rigoberto/redirection to enhance attention/executive functioning.- newly established on 5/9/22    4) Pt will complete various oral motor exercises (lingual elevation, lingual press, linugal circles jaw strengthening),coordination tasks (specifically:, lingual elevation/depression x5 within 6 secs, and tongue placement coordination (ka-la) x10 within 5 secs), and tongue base retraction ex  to improve oral strength/ ROM/coordination for speech/swallowing .- newly established on 5/9/22    5) Pt will recall and demo comp strategies (increase intensity, slow down, rate, and over-articulate) in 90% acc when presented with Rigoberto to improve speech intelligibility. - newly established on 5/9/22    6) Pt will demonstrate ability to produce improve prosody, rhyme, rhythm in structured sentences, word emphasis tasks, and varying pitch phrases using various comp strategies with 80% acc given min cues in 4/4 opportunities to improve naturalness of speech. - newly established on 5/9/22      Long Term Goals: To be accomplished in 4 weeks   1. Patient will consume a least restrictive diet utilizing compensatory swallowing strategies with amandeep in 4/5 trials without s/s of aspiration/ penetration to promote QOL, enhance hydration/ nutrition status, and reduce risk of aspiration. Continue addressing    2)  Patient will develop improved functional cognitive-linguistic based communication skills with use of comp strategies to communicate wants/ needs effectively, maintain safety, and participate in social activities of choice with  90% acc with amandeep as supported by BCAT reassessment. - newly established on 5/9/22    3) Patient will develop functional and intelligible speech with use of compensatory strategies through the use of adequate labial and lingual function, adequate intensity, increased articulatory precision, and speech prosody to >90% intelligibility at conversational level. - newly established on 5/9/22      Frequency / Duration: Patient to be seen 2 times per week for 4 weeks:    Patient/ Caregiver education and instruction: Diagnosis, prognosis, Compensatory Techniques,  And mutually established goal for POC.     Certification Period: 5/9/22-6/6/22    Mila Prasad, SLP 5/9/2022 4:38 PM  MA, [facility]-SLP  Speech-Language Pathologist      ______________________________________________________________________    I certify that the above Therapy Services are being furnished while the patient is under my care. I agree with the treatment plan and certify that this therapy is necessary. Physician's Signature:____________________  Date:___________Time:_________    Please sign and return to [facility].   Ph: [phone]  Fax: [phone]

## 2022-05-10 ENCOUNTER — HOSPITAL ENCOUNTER (OUTPATIENT)
Dept: PHYSICAL THERAPY | Age: 42
Discharge: HOME | End: 2022-05-10
Payer: MEDICARE

## 2022-05-10 ENCOUNTER — APPOINTMENT (OUTPATIENT)
Dept: PHYSICAL THERAPY | Age: 42
End: 2022-05-10
Payer: MEDICARE

## 2022-05-10 PROCEDURE — 97110 THERAPEUTIC EXERCISES: CPT

## 2022-05-10 PROCEDURE — 92526 ORAL FUNCTION THERAPY: CPT

## 2022-05-10 PROCEDURE — 92507 TX SP LANG VOICE COMM INDIV: CPT

## 2022-05-10 PROCEDURE — 97530 THERAPEUTIC ACTIVITIES: CPT

## 2022-05-10 NOTE — PROGRESS NOTES
PHYSICAL THERAPY - DAILY TREATMENT NOTE    Patient Name: Saeed Stiles        Date: 5/10/2022  : 1980   YES Patient  Verified  Visit #:   4     Insurance: Payor: Jazmyne Curry / Plan: [place] / Product Type: Managed Care Medicare /      In time: 4:18 Out time: 4:48   Total Treatment Time: 30     Medicare/BCBS Nice Time Tracking (below)   Total Timed Codes (min):  30 1:1 Treatment Time:  30     TREATMENT AREA =  Gait instability [R26.81]  Multiple sclerosis [G35]  SUBJECTIVE    Pain Level (on 0 to 10 scale):  0  / 10   Medication Changes/New allergies or changes in medical history, any new surgeries or procedures? NO    If yes, update Summary List   Subjective Functional Status/Changes:  []  No changes reported     Pt denies pain.           OBJECTIVE    20 min Therapeutic Exercise:  [x]  See flow sheet   Rationale:      increase ROM, increase strength, improve coordination, improve balance and increase proprioception to improve the patients ability to perform ADLs/IADLs, functional mobility and gait safely and independently without increased pain/symptoms     10 min Therapeutic Activity: See flow sheet   Rationale:  increase ROM, increase strength, improve coordination, improve balance and increase proprioception to improve the patients ability to perform ADLs/IADLs, functional mobility and gait safely and independently without increased pain/symptoms       During TE/TA min Patient Education:  YES  Reviewed HEP   []  Progressed/Changed HEP based on:   Cont HEP     Other Objective/Functional Measures:    Exercises per flow sheet - added LE stretches     Post Treatment Pain Level (on 0 to 10) scale:   0  / 10     ASSESSMENT    Assessment/Changes in Function:     Tolerated exercises with several seated therapeutic rest breaks     []  See Progress Note/Recertification   Patient will continue to benefit from skilled PT services to modify and progress therapeutic interventions, address functional mobility deficits, address ROM deficits, address strength deficits, analyze and address soft tissue restrictions, analyze and cue movement patterns, analyze and modify body mechanics/ergonomics, assess and modify postural abnormalities, address imbalance/dizziness and instruct in home and community integration to attain remaining goals. Progress toward goals / Updated goals:    Progressing toward goals: · Short Term Goals: To be accomplished in  4  weeks:  1.  Patient will be compliant with home exercise program. Reports compliance with HEP  2.  Complete FGA to assess fall risk. MET last visit  3.  Complete Dodson Balance Scale to assess fall risk.  MET last visit       PLAN    [x]  Upgrade activities as tolerated YES Continue plan of care   []  Discharge due to :    []  Other:      Therapist: Gianluca Vasquez PT    Date: 5/10/2022 Time: 4:20 PM     Future Appointments   Date Time Provider Tawny Shelton   5/16/2022  3:30 PM Ludy Arnold, [address] [facility]   5/19/2022 11:00 AM April Anna PT [facility]   5/23/2022 12:30 PM April Anna PT [facility]   5/23/2022  3:30 PM MIGUEL Can [facility]   5/26/2022 11:45 AM MIGUEL Can [facility]   5/26/2022 12:30 PM [facility]

## 2022-05-10 NOTE — PROGRESS NOTES
ST DAILY TREATMENT NOTE    Patient Name: Rosalba Wu  Date:5/10/2022  : 1980  [x]  Patient  Verified  Payor: Payor: Yee Robertson / Plan: [employer] / Product Type: Managed Care Medicare /   In time: 3:33  Out time:415  Total Treatment Time (min): 42  Visit #: 2 of 8    Treatment Diagnosis: Dysphagia, oral phase [R13.11]  Chewing difficulty [K08.89]    SUBJECTIVE  Pain Level (0-10 scale): 0  Any medication changes, allergies to medications, adverse drug reactions, diagnosis change, or new procedure performed?: [x] No    [] Yes (see summary sheet for update)    Subjective functional status/changes:   [x] No changes reported    OBJECTIVE  Treatment provided includes:  Increase/Improve:  []  Voice Quality [x]  Cognitive Linguistic Skills []  Laryngeal/Pharyngeal Exercises   []  Vocal Loudness []  Reading Comprehension [x]  Swallowing Skills    []  Vocal Cord Function []  Auditory Comprehension [x]  Oral Motor Skills   []  Resonance []  Writing Skills [x]  Compensatory strategies    []  Speech Intelligibility []  Expressive Language []  Attention   []  Breath Support/Coord.  []  Receptive language [x]  Memory   []  Articulation []  Safety Awareness [x] pt education    []  Fluency []  Word Retrieval []        Treatment Provided:  -pt education  -OMES for speech and swallow  -po trials + comp strategies  -immediate/delayed recall     Patient/Caregiver  Education: [x] Review HEP      HEP/Handouts given: comp strategies for swallowing with mirror    Pain Level (0-10 scale) post treatment: 0    ASSESSMENT   []   Improving appropriately and progressing toward goals  [x]   Improving slowly and progressing toward goals  []   Approximating goals/maximum potential  [x]   Continues to benefit from skilled therapy to address remaining functional deficits  []   Not progressing toward goals and plan of care will be adjusted    Patient will continue to benefit from skilled therapy to address remaining functional deficits: cognitive-linguistics, motor speech, swallowing     Progress towards goals / Updated goals:  1. Pt will recall/demo aspiration precautions and safe swallowing strategies with 100% acc with amandeep from visual aid in 3/3 sessions (Sit upright at 90 degree angle during po trials and for 30 minute post po intake; small sips/bites; effortful swallow; alternate liquids/solids; slow rate; oral care 2-3x daily) to decrease the reported incidences of dysphagia and reduce risk of aspiration.     Current: 5/10/22: Re-education provided  Recall:     2. Pt will tolerate trials of regular, puree, mixed consistency, and thin liquids trials  using comp strategies without s/sx of aspiration/ penetration given amandeep with visual aid A in 3/3 sessions.    Current: 5/10/22: Regular solids/ thin liquids: 0 s/sx of aspiration/penetration amandeep with visual AID    3. Pt will demonstrate a more coordinate and efficient rotary chew with adequate bolus formation/manipulation, and complete oral clearance in 3/3 sessions when provided with amandeep with visual aid to promote safe po intake and QOL.      Current: 5/10/22: Pt exhibited slow, but functional rotary chew using a mirror for visual feedback with min cues    Speech Goals:  1)  Pt will recall x3 comp strategies and apply them as deemed appropriate in order to complete delayed recall tasks (novel information and 3-4+ items) presented verbally/visually given a  5-10 minute delay with 90% acc given min cues to improve STM for safety and independence.   Current: 5/10/22: Pt able to recall x 2/4 comps  Recall x3 words with self implemented picture it and association: following 5 minute delay: 100% acc given Rigoberto     Novel information given 5 min delay: 70% acc given modA     2)  Pt will completed immediate recall tasks (words 3-5, digits3-5, novel recall 2-4 sentences, etc)  with 80% acc given min cues and use of comp strategies in various structured tasks to improve safety, independence and communication competence.   Yissel Miles: 5/10/22: Recalling elements from novel information: 50% acc Pedro Pablo and increasing 75% acc given min-mod cues     3) Pt will complete mental flexibility tasks with 90% acc given Rigoberto/redirection to enhance attention/executive functioning.   Current: 5/10/22: Address next session     4) Pt will complete various oral motor exercises (lingual elevation, lingual press, linugal circles jaw strengthening),coordination tasks (specifically:, lingual elevation/depression x5 within 6 secs, and tongue placement coordination (ka-la) x10 within 5 secs), and tongue base retraction ex  to improve oral strength/ ROM/coordination for speech/swallowing   Current: 5/10/22: Min-mod skilled instructions to implement:  Jaw x10 holding for 5 seconds biting; lingual elevation x15 holding x5 sec each; Tongue circles: x15 reps each way with mod cues/visual feedback for labial seal      5) Pt will recall and demo comp strategies (increase intensity, slow down, rate, and over-articulate) in 90% acc when presented with Rigoberto to improve speech intelligibility.   Current: 5/10/22: Address next session     6) Pt will demonstrate ability to produce improve prosody, rhyme, rhythm in structured sentences, word emphasis tasks, and varying pitch phrases using various comp strategies with 80% acc given min cues in 4/4 opportunities to improve naturalness of speech. Current: 5/10/22: Address next session     PLAN  [x]  Continue plan of care  []  Modify Goals/Treatment Plan      []  Discharge due to:  [] Other:    Terell Crandall SLP 5/10/2022  3:39 PM  [place], [address] [facility]  Speech-Language Pathologist    Speech Tx: 27 min  Swallow tx 15 min   Future Appointments   Date Time Provider Tawny Shelton   5/10/2022  4:15 PM Ina Waggoner, PT [facility] [address]   5/16/2022  3:30 PM Madan Mckeon, SLP [facility] [address]   5/19/2022 11:00 AM Maximilian Mijares PT [facility] [address]   5/23/2022 12:30 PM Maximilian Mijares PT [facility]   5/23/2022  3:30 PM Sissy Londono, MIGUEL [facility]   5/26/2022 11:45 AM MIGUEL Connors [facility]   5/26/2022 12:30 PM Robina Dowell, PT [id number]

## 2022-05-16 ENCOUNTER — HOSPITAL ENCOUNTER (OUTPATIENT)
Dept: PHYSICAL THERAPY | Age: 42
Discharge: HOME | End: 2022-05-16
Payer: MEDICARE

## 2022-05-16 PROCEDURE — 92526 ORAL FUNCTION THERAPY: CPT

## 2022-05-16 PROCEDURE — 92507 TX SP LANG VOICE COMM INDIV: CPT

## 2022-05-16 NOTE — PROGRESS NOTES
ST DAILY TREATMENT NOTE    Patient Name: Saeed Stiles  Date:2022  : 1980  [x]  Patient  Verified  Payor: Payor: [employer] / Plan: [employer] / Product Type: Managed Care Medicare /   In time:  335 Out time:415  Total Treatment Time (min): 40  Visit #: 3 of 8    Treatment Diagnosis: Dysphagia, oral phase [R13.11]  Chewing difficulty [K08.89]    SUBJECTIVE  Pain Level (0-10 scale): 0  Any medication changes, allergies to medications, adverse drug reactions, diagnosis change, or new procedure performed?: [x] No    [] Yes (see summary sheet for update)    Subjective functional status/changes:   [x] No changes reported    OBJECTIVE  Treatment provided includes:  Increase/Improve:  []  Voice Quality [x]  Cognitive Linguistic Skills []  Laryngeal/Pharyngeal Exercises   []  Vocal Loudness []  Reading Comprehension [x]  Swallowing Skills    []  Vocal Cord Function []  Auditory Comprehension [x]  Oral Motor Skills   []  Resonance []  Writing Skills [x]  Compensatory strategies    [x]  Speech Intelligibility []  Expressive Language []  Attention   []  Breath Support/Coord.  []  Receptive language [x]  Memory   []  Articulation []  Safety Awareness [x] pt education    []  Fluency []  Word Retrieval []        Treatment Provided:  -pt education  -OMES for  swallow  -swallowing comp strategies  -mental flexibility  -speech intelligibility with comp strategies  - rhyme, rhythm, prosody tasks    Patient/Caregiver  Education: [x] Review HEP      HEP/Handouts given: comp strategies for swallowing with mirror    Pain Level (0-10 scale) post treatment: 0    ASSESSMENT   []   Improving appropriately and progressing toward goals  [x]   Improving slowly and progressing toward goals  []   Approximating goals/maximum potential  [x]   Continues to benefit from skilled therapy to address remaining functional deficits  []   Not progressing toward goals and plan of care will be adjusted    Patient will continue to benefit from skilled therapy to address remaining functional deficits: cognitive-linguistics, motor speech, swallowing     Progress towards goals / Updated goals:  1. Pt will recall/demo aspiration precautions and safe swallowing strategies with 100% acc with amandeep from visual aid in 3/3 sessions (Sit upright at 90 degree angle during po trials and for 30 minute post po intake; small sips/bites; effortful swallow; alternate liquids/solids; slow rate; oral care 2-3x daily) to decrease the reported incidences of dysphagia and reduce risk of aspiration.     Current: 5/16/22: Re-education provided  Recall: intake rate, portion size; SLP re-educ re: posture/positioning, alt solids/liquids- liquid wash     2. Pt will tolerate trials of regular, puree, mixed consistency, and thin liquids trials  using comp strategies without s/sx of aspiration/ penetration given amandeep with visual aid A in 3/3 sessions.    Current: 5/16/22: Not targeted this date. 3. Pt will demonstrate a more coordinate and efficient rotary chew with adequate bolus formation/manipulation, and complete oral clearance in 3/3 sessions when provided with amandeep with visual aid to promote safe po intake and QOL.      Current: 5/16/22: Not targeted this date    Speech Goals:  1)  Pt will recall x3 comp strategies and apply them as deemed appropriate in order to complete delayed recall tasks (novel information and 3-4+ items) presented verbally/visually given a  5-10 minute delay with 90% acc given min cues to improve STM for safety and independence.   Current: 5/16/22: Not targeted this date.      2)  Pt will completed immediate recall tasks (words 3-5, digits3-5, novel recall 2-4 sentences, etc)  with 80% acc given min cues and use of comp strategies in various structured tasks to improve safety, independence and communication competence.   Rajni Aguirre: 5/16/22: Not targeted this date.     3) Pt will complete mental flexibility tasks with 90% acc given Rigoberto/redirection to enhance attention/executive functioning.   Current: 5/16/22: 89% acc Pedro Pablo and increasing to 100% acc given Rigoberto/redirection. 4) Pt will complete various oral motor exercises (lingual elevation, lingual press, linugal circles jaw strengthening),coordination tasks (specifically:, lingual elevation/depression x5 within 6 secs, and tongue placement coordination (ka-la) x10 within 5 secs), and tongue base retraction ex  to improve oral strength/ ROM/coordination for speech/swallowing   Current: 5/16/22: Jaw x15 holding for 5 seconds biting (7 tongue depressors); lingual elevation x15 holding x5 sec each; Mod skilled instruction/teaching to implement Rachel maneuver: x1/8 trials successful      5) Pt will recall and demo comp strategies (increase intensity, slow down, rate, and over-articulate) in 90% acc when presented with Rigoberto to improve speech intelligibility.   Current: 5/16/22: Mod skilled instruction to re-teach comp strategies and apply to STG #6; 80% acc given min-mod cues      6) Pt will demonstrate ability to produce improve prosody, rhyme, rhythm in structured sentences, word emphasis tasks, and varying pitch phrases using various comp strategies with 80% acc given min cues in 4/4 opportunities to improve naturalness of speech. Current: 5/16/22: Word emphasis: 85% acc given modA- and fading Rigoberto.      PLAN  [x]  Continue plan of care  []  Modify Goals/Treatment Plan      []  Discharge due to:  [] Other:    Arnaldo Fothergill, SLP 5/16/2022  3:39 PM  [place], [address] [facility] [place]  Speech-Language Pathologist    Speech Tx: 22 min   Swallow tx 18 min   Future Appointments   Date Time Provider Tawny Shelton   5/16/2022  3:30 PM Michaela Greene, [address] [facility]   5/19/2022 11:00 AM Henry Mackey PT [facility]   5/23/2022 12:30 PM Henry Mackey PT [facility]   5/23/2022  3:30 PM MIGUEL Donnelly [facility]   5/26/2022 11:45 AM Michaela Greene SLP [facility]   5/26/2022 12:30 PM Kaylyn Mulligan, PT [facility]

## 2022-05-19 ENCOUNTER — HOSPITAL ENCOUNTER (OUTPATIENT)
Dept: PHYSICAL THERAPY | Age: 42
End: 2022-05-19
Payer: MEDICARE

## 2022-05-23 ENCOUNTER — HOSPITAL ENCOUNTER (OUTPATIENT)
Dept: PHYSICAL THERAPY | Age: 42
Discharge: HOME | End: 2022-05-23
Payer: MEDICARE

## 2022-05-23 PROCEDURE — 92507 TX SP LANG VOICE COMM INDIV: CPT

## 2022-05-23 PROCEDURE — 97110 THERAPEUTIC EXERCISES: CPT

## 2022-05-23 PROCEDURE — 97116 GAIT TRAINING THERAPY: CPT

## 2022-05-23 PROCEDURE — 97530 THERAPEUTIC ACTIVITIES: CPT

## 2022-05-23 NOTE — PROGRESS NOTES
[address] [facility] PHYSICAL THERAPY  [address] [facility] [address], [address] - Phone: [phone]  Fax: [phone] [facility] FOR PHYSICAL [facility]ERAPY          Patient Name: Agus Rush : 1980   Treatment/Medical Diagnosis: Gait instability [R26.81]  Multiple sclerosis [G35]   Onset Date: Chronic    Referral Source: Veneda Sprain, NP Start of Care [facility]): 2022   Prior Hospitalization: See Medical History Provider #: [id number]   Prior Level of Function: Ambulating with SPC   Comorbidities: Anxiety, depression, back pain, IBS, MS, insomnia, migraines   Medications: Verified on Patient Summary List   Visits from [facility]: 5 Missed Visits: 1     Goal/Measure of Progress Goal Met? 1. Patient will be compliance with home exercise program   Status at last Eval: NA Current Status: Compliant with HEP yes   2. Complete FGA to assess fall risk. Status at last Eval: NA Current Status: FGA =  on 2022,  on 2022 yes   3. Complete Dodson Balance Scale to assess fall risk. Status at last Eval: NA Current Status: Dodson = 39/56 on 2022 yes     Key Functional Changes/Progress: Patient has progressed with exercises in clinic and reports compliance with HEP. FOTO has increased from 51/100 to 58/100, indicating improved function. Hip strength has improved (see below). FTSST = 22\" (increased slightly from 19\"), indicating increased risk of fall. Gait speed as measured by 10-meter walk test = 0.86 m/s (decreased slightly from 1.0 m/s). FGA has improved from  to , indicating improved balance; however, patient remains at increased risk of fall based on score < 22/30. Dodson = 10/; patient remains at increased risk of fall based on score < 50/56.       Strength (MMT):     Hip L (0-5) R (0-5)   Hip Flexion 5 5   Hip Ext 4- 4-   Hip ABD 4 4   Hip ADD 4  4    Hip ER 4-  4-    Hip IR  4-  4-      Problem List: pain affecting function, decrease ROM, decrease strength, impaired gait/ balance, decrease ADL/ functional abilitiies, decrease activity tolerance, decrease flexibility/ joint mobility and decrease transfer abilities   Treatment Plan may include any combination of the following: Therapeutic exercise, Therapeutic activities, Neuromuscular re-education, Physical agent/modality, Gait/balance training, Manual therapy, Patient education, Self Care training, Functional mobility training, Home safety training and Stair training  Patient Goal(s) has been updated and includes:      Goals for this certification period include and are to be achieved in   2-4  weeks:  1. Patient will be independent with home exercise program.  2.  Increase FGA score to 23/30 to decrease fall risk. 3.  Increase Dodson score to 45/56 to decrease fall risk. Frequency / Duration:   Patient to be seen   2-3   times per week for   2-4    weeks:    Assessments/Recommendations: Patient would benefit from continued skilled PT services to address pain, decreased ROM, decreased strength, decreased balance, decreased position/activity tolerance and impaired functional mobility/gait to improve the patient's ability to perform ADLs/IADLs, functional mobility and gait safely and independently without increased pain/symptoms. If you have any questions/comments please contact us directly at [phone]. Thank you for allowing us to assist in the care of your patient. Therapist Signature: Janine Arnold PT Date: 9/61/0440   Certification Period:  Reporting Period: 4/20/2022-7/19/2022 4/20/2022-5/23/2022 Time: 2:46 PM   NOTE TO PHYSICIAN:  PLEASE COMPLETE THE ORDERS BELOW AND FAX TO   [facility] [place] Physical Therapy: [phone].   If you are unable to process this request in 24 hours please contact our office: [phone].    ___ I have read the above report and request that my patient continue as recommended.   ___ I have read the above report and request that my patient continue therapy with the following changes/special instructions: ________________________________________________   ___ I have read the above report and request that my patient be discharged from therapy.      Physician Signature:        Date:       Time:    Manny Smith NP

## 2022-05-23 NOTE — PROGRESS NOTES
PHYSICAL THERAPY - DAILY TREATMENT NOTE    Patient Name: Saeed Stiles        Date: 2022  : 1980   YES Patient  Verified  Visit #:   5     Insurance: Payor: Jazmyne Patricio / Plan: [employer] / Product Type: Managed Care Medicare /      In time: 2:02 Out time: 2:34   Total Treatment Time: 32     Medicare/BCBS [place] Time Tracking (below)   Total Timed Codes (min):  32 1:1 Treatment Time:  32     TREATMENT AREA =  Gait instability [R26.81]  Multiple sclerosis [G35]  SUBJECTIVE    Pain Level (on 0 to 10 scale):  0  / 10   Medication Changes/New allergies or changes in medical history, any new surgeries or procedures? NO    If yes, update Summary List   Subjective Functional Status/Changes:  []  No changes reported     Pt denies pain at start of session, reports pain \"all over\" at end of session and requests to stop session.           OBJECTIVE    12 min Therapeutic Exercise:  [x]  See flow sheet, MMT   Rationale:      increase ROM, increase strength, improve coordination, improve balance and increase proprioception to improve the patients ability to perform ADLs/IADLs, functional mobility and gait safely and independently without increased pain/symptoms     12 min Therapeutic Activity: FOTO, FTSST, see flow sheet   Rationale: assess and improve functional mobility and gait to improve the patient's ability to perform ADLs/IADLs, functional mobility and gait safely and independently without increased pain/symptoms      8 min Gait Training: FGA, 10-meter walk test   Rationale: assess gait to improve the patient's ability to perform ADLs/IADLs, functional mobility and gait safely and independently without increased pain/symptoms       During TE min Patient Education:  YES  Reviewed HEP   []  Progressed/Changed HEP based on:   Cont HEP     Other Objective/Functional Measures:    FOTO = 58/100    FTSST = 22\"    FGA =     10-meter walk test = 7\" (0.86 m/s)    Ambulates without AD with right lateral trunk lean, adduction right LE    Strength (MMT):    Hip L (0-5) R (0-5)   Hip Flexion 5 5   Hip Ext 4- 4-   Hip ABD 4 4   Hip ADD 4  4    Hip ER 4-  4-    Hip IR  4-  4-       Post Treatment Pain Level (on 0 to 10) scale:   4.75  / 10     ASSESSMENT    Assessment/Changes in Function:     See continues plan of care   [x]  See Progress Note/Recertification   Patient will continue to benefit from skilled PT services: see continued plan of care   Progress toward goals / Updated goals:    See continued plan of care     PLAN    [x]  Upgrade activities as tolerated YES Continue plan of care   []  Discharge due to :    []  Other:      Therapist: Rachel Schofield PT    Date: 5/23/2022 Time: 2:04 PM     Future Appointments   Date Time Provider Tawny Shelton   5/26/2022 11:45 AM MIGUEL Dacosta [facility]   5/26/2022 12:30 PM Zulema Jones, PT [facility]   6/2/2022 11:00 AM Court Patterson, PT [facility]   6/2/2022 11:45 AM MIGUEL Dacosta [facility]   6/7/2022 11:00 AM Court Patterson PT [facility]   6/7/2022 11:45 AM MIGUEL Dacosta [facility]   6/9/2022 11:00 AM Court Patterson PT [facility]   6/9/2022 11:45 AM MIGUEL Dacosta [facility]   6/14/2022  1:15 PM MIGUEL Dacosta [facility]   6/14/2022  2:00 PM Court Patterson PT [facility] [place]   6/16/2022 11:00 AM MIGUEL Dacosta [facility]   6/16/2022 11:45 AM Court Patterson, PT [facility] [place]   6/21/2022 11:00 AM MIGUEL Dacosta [facility] [place]   6/21/2022 11:45 AM Court Patterson, PT [facility]   6/23/2022 11:00 AM [facility]IGUEL Dacosta [facility] [place]   6/23/2022 11:45 AM Coutr Patterson, PT [facility] [place]   6/28/2022 11:00 AM MIGUEL Dacosta [facility] [place]   6/28/2022 11:45 AM Court Patterson, PT [facility] [place]   6/30/2022 11:00 AM MIGUEL Dacosta [facility] [place]   6/30/2022 11:45 AM Court Patterson, PT [facility] [place]

## 2022-05-23 NOTE — PROGRESS NOTES
ST DAILY TREATMENT NOTE    Patient Name: Jerod Hyde  Date:2022  : 1980  [x]  Patient  Verified  Payor: Payor: Abbey Seo / Plan: [employer] / Product Type: Managed Care Medicare /   In time: 1:20 Out time: 2:00  Total Treatment Time (min): 40  Visit #: 4 of 8  *PN due 22*    Treatment Diagnosis: Dysphagia, oral phase [R13.11]  Chewing difficulty [K08.89]    SUBJECTIVE  Pain Level (0-10 scale): 0  Any medication changes, allergies to medications, adverse drug reactions, diagnosis change, or new procedure performed?: [x] No    [] Yes (see summary sheet for update)    Subjective functional status/changes:   [x] No changes reported  Pt reports she been doing well her chewing/ swallowing. OBJECTIVE  Treatment provided includes:  Increase/Improve:  []  Voice Quality [x]  Cognitive Linguistic Skills []  Laryngeal/Pharyngeal Exercises   []  Vocal Loudness []  Reading Comprehension [x]  Swallowing Skills    []  Vocal Cord Function []  Auditory Comprehension [x]  Oral Motor Skills   []  Resonance []  Writing Skills [x]  Compensatory strategies    [x]  Speech Intelligibility []  Expressive Language []  Attention   []  Breath Support/Coord.  []  Receptive language [x]  Memory   []  Articulation []  Safety Awareness [x] pt education    []  Fluency []  Word Retrieval []        Treatment Provided:  -pt education  -comp strategies  -speech intelligibility with comp strategies  - STM/ immediate recall     Patient/Caregiver  Education: [x] Review HEP      HEP/Handouts given: comp strategies for speech intelligibility    Pain Level (0-10 scale) post treatment: 0    ASSESSMENT   []   Improving appropriately and progressing toward goals  [x]   Improving slowly and progressing toward goals  []   Approximating goals/maximum potential  [x]   Continues to benefit from skilled therapy to address remaining functional deficits  []   Not progressing toward goals and plan of care will be adjusted    Patient will continue to benefit from skilled therapy to address remaining functional deficits: cognitive-linguistics, motor speech, swallowing     Progress towards goals / Updated goals:  1. Pt will recall/demo aspiration precautions and safe swallowing strategies with 100% acc with amandeep from visual aid in 3/3 sessions (Sit upright at 90 degree angle during po trials and for 30 minute post po intake; small sips/bites; effortful swallow; alternate liquids/solids; slow rate; oral care 2-3x daily) to decrease the reported incidences of dysphagia and reduce risk of aspiration.     Current: 5/23/22: Not targeted this date    2. Pt will tolerate trials of regular, puree, mixed consistency, and thin liquids trials  using comp strategies without s/sx of aspiration/ penetration given amandeep with visual aid A in 3/3 sessions.    Current: 5/23/22: Not targeted this date. 3. Pt will demonstrate a more coordinate and efficient rotary chew with adequate bolus formation/manipulation, and complete oral clearance in 3/3 sessions when provided with amandeep with visual aid to promote safe po intake and QOL.      Current: 5/23/22: Not targeted this date    Speech Goals:  1)  Pt will recall x3 comp strategies and apply them as deemed appropriate in order to complete delayed recall tasks (novel information and 3-4+ items) presented verbally/visually given a  5-10 minute delay with 90% acc given min cues to improve STM for safety and independence.   Current: 5/23/22: Pt able to recall x4 words with self introduced comp strategy- association: 100% acc with 6 delay recall. Novel Information: Self introduced picture it and repetition: 60% acc QUINN and increasing to 100% acc given Rigoberto/redirection.       2)  Pt will completed immediate recall tasks (words 3-5, digits3-5, novel recall 2-4 sentences, etc)  with 80% acc given min cues and use of comp strategies in various structured tasks to improve safety, independence and communication competence.   Rajni Timothy: 22: Story retell: 80% acc QUINN and increasing to 100% acc given min-modA. 3) Pt will complete mental flexibility tasks with 90% acc given Rigoberto/redirection to enhance attention/executive functioning.   Current: 22: Not targeted this date. 4) Pt will complete various oral motor exercises (lingual elevation, lingual press, linugal circles jaw strengthening),coordination tasks (specifically:, lingual elevation/depression x5 within 6 secs, and tongue placement coordination (ka-la) x10 within 5 secs), and tongue base retraction ex  to improve oral strength/ ROM/coordination for speech/swallowing   Current: 22: Not targeted this date     5) Pt will recall and demo comp strategies (increase intensity, slow down, rate, and over-articulate) in 90% acc when presented with Rigoberto to improve speech intelligibility.   Current: 22: Re-educated with mod skilled instruction to comp strategies. Oral readin% acc  And Conversation: 80% acc       6) Pt will demonstrate ability to produce improve prosody, rhyme, rhythm in structured sentences, word emphasis tasks, and varying pitch phrases using various comp strategies with 80% acc given min cues in 4/4 opportunities to improve naturalness of speech.    Current: 22: Not targeted this date     PLAN  [x]  Continue plan of care  []  Modify Goals/Treatment Plan      []  Discharge due to:  [] Other:    MIGUEL Hyman 2022  3:39 PM  MA, [address] Pathologist    Future Appointments   Date Time Provider Tawny Shelton   2022  2:00 PM Martir Dietz, PT [facility]   2022 11:45 AM MIGUEL Vásquez [facility]   2022 12:30 PM Wally Andrade PT [facility]   2022 11:00 AM Martir Dietz PT [facility]   2022 11:45 AM MIGUEL Vásquez [facility]   2022 11:00 AM Martir Dietz, PT [facility]   2022 11:45 AM MIGUEL Vásquez [facility] [address]   6/9/2022 11:00 AM Mae Quezada, PT [facility] [address]   6/9/2022 11:45 AM Venkatesh Real, [facility] [address]   6/14/2022  1:15 PM Venkatesh Real, SLP [facility] [address]   6/14/2022  2:00 PM Mae Quezada, PT [facility] [address]   6/16/2022 11:00 AM Venkatesh Real, [facility] [address]   6/16/2022 11:45 AM Mae Quezada, PT [place] [address] [address]   6/21/2022 11:00 AM Venkatesh Real, [facility] [address]   6/21/2022 11:45 AM Mae Quezada, PT [place] [address] [address]   6/23/2022 11:00 AM Venkatesh Real, [facility] [address]   6/23/2022 11:45 AM Mae Quezada, PT [facility] [address]   6/28/2022 11:00 AM Venkatesh Real, [facility] [address]   6/28/2022 11:45 AM Mae Quezada, PT [place] [address] [address]   6/30/2022 11:00 AM Venkatesh Real, [facility] [address]   6/30/2022 11:45 AM Mae Quezada, PT [place] [address] [address]

## 2022-05-26 ENCOUNTER — APPOINTMENT (OUTPATIENT)
Dept: PHYSICAL THERAPY | Age: 42
End: 2022-05-26
Payer: MEDICARE

## 2022-05-26 ENCOUNTER — HOSPITAL ENCOUNTER (OUTPATIENT)
Dept: PHYSICAL THERAPY | Age: 42
End: 2022-05-26
Payer: MEDICARE

## 2022-06-02 ENCOUNTER — APPOINTMENT (OUTPATIENT)
Dept: PHYSICAL THERAPY | Age: 42
End: 2022-06-02
Payer: MEDICARE

## 2022-06-02 NOTE — PROGRESS NOTES
PT/OT/ST NEURO ROUNDS    Patient Name: Johanna Thompson        Date: 2022  : 1980   YES Patient  Verified  Referring Practitioner: Alfredo Dunbar NP    PT:   Pain and fatigue are limiting factors. Improving gait on FGA. ST:   Working on memory deficits, swallowing (coordination), speech intelligibility.     Therapists Present:  [employer], Nandini Felder [address], MPT  Marlene Gaston, CCC-SLP

## 2022-06-07 ENCOUNTER — HOSPITAL ENCOUNTER (OUTPATIENT)
Dept: PHYSICAL THERAPY | Age: 42
End: 2022-06-07
Payer: MEDICARE

## 2022-06-07 ENCOUNTER — APPOINTMENT (OUTPATIENT)
Dept: PHYSICAL THERAPY | Age: 42
End: 2022-06-07
Payer: MEDICARE

## 2022-06-09 ENCOUNTER — HOSPITAL ENCOUNTER (OUTPATIENT)
Dept: PHYSICAL THERAPY | Age: 42
Discharge: HOME | End: 2022-06-09
Payer: MEDICARE

## 2022-06-09 PROCEDURE — 92507 TX SP LANG VOICE COMM INDIV: CPT

## 2022-06-09 PROCEDURE — 97530 THERAPEUTIC ACTIVITIES: CPT

## 2022-06-09 PROCEDURE — 97110 THERAPEUTIC EXERCISES: CPT

## 2022-06-09 PROCEDURE — 92526 ORAL FUNCTION THERAPY: CPT

## 2022-06-09 PROCEDURE — 97112 NEUROMUSCULAR REEDUCATION: CPT

## 2022-06-09 NOTE — PROGRESS NOTES
PHYSICAL THERAPY - DAILY TREATMENT NOTE    Patient Name: Cliff Sherwood        Date: 2022  : 1980   YES Patient  Verified  Visit #:     Insurance: Payor: Katey Ybarra / Plan: [employer] / Product Type: Managed Care Medicare /      In time: 2:45 Out time: 3:15   Total Treatment Time: 30     Medicare/BCBS [place] Time Tracking (below)   Total Timed Codes (min):  30 1:1 Treatment Time:  30     TREATMENT AREA =  Gait instability [R26.81]  Multiple sclerosis [G35]  SUBJECTIVE    Pain Level (on 0 to 10 scale):  0  / 10   Medication Changes/New allergies or changes in medical history, any new surgeries or procedures? NO    If yes, update Summary List   Subjective Functional Status/Changes:  []  No changes reported     Pt denies pain. Pt reports non-compliance with HEP.         OBJECTIVE    12 min Therapeutic Exercise:  [x]  See flow sheet   Rationale:      increase ROM, increase strength, improve coordination, improve balance and increase proprioception to improve the patients ability to perform ADLs/IADLs, functional mobility and gait safely and independently without increased pain/symptoms     10 min Therapeutic Activity: See flow sheet   Rationale:increase ROM, increase strength, improve coordination, improve balance and increase proprioception to improve the patients ability to perform ADLs/IADLs, functional mobility and gait safely and independently without increased pain/symptoms       8 min Neuromuscular Re-ed: See flow sheet   Rationale: increase ROM, increase strength, improve coordination, improve balance and increase proprioception to improve the patients ability to perform ADLs/IADLs, functional mobility and gait safely and independently without increased pain/symptoms      During TE/TA/NM min Patient Education:  YES  Reviewed HEP   []  Progressed/Changed HEP based on:   Cont HEP     Other Objective/Functional Measures:    Exercises per flow sheet     Post Treatment Pain Level (on 0 to 10) scale:   0  / 10     ASSESSMENT    Assessment/Changes in Function:     Tolerated exercises without complaints     []  See Progress Note/Recertification   Patient will continue to benefit from skilled PT services to modify and progress therapeutic interventions, address functional mobility deficits, address ROM deficits, address strength deficits, analyze and address soft tissue restrictions, analyze and cue movement patterns, analyze and modify body mechanics/ergonomics, assess and modify postural abnormalities, address imbalance/dizziness and instruct in home and community integration to attain remaining goals. Progress toward goals / Updated goals:    Progressing toward goals:  1.  Patient will be independent with home exercise program. - Reports non-compliance  2.  Increase FGA score to 23/30 to decrease fall risk. 3.  Increase Dodson score to 45/56 to decrease fall risk.        PLAN    [x]  Upgrade activities as tolerated YES Continue plan of care   []  Discharge due to :    []  Other:      Therapist: Stacy Klein PT    Date: 6/9/2022 Time: 2:51 PM     Future Appointments   Date Time Provider Tawny Shelton   6/14/2022  1:15 PM MIGUEL Noble [facility]   6/14/2022  2:00 PM Zoë Hussein, PT [facility]   6/16/2022 11:00 AM MIGUEL Noble [facility]   6/16/2022 11:45 AM Zoë Hussein, PT [facility]   6/21/2022  2:45 PM MIGUEL Noble [facility]   6/21/2022  3:30 PM Roland Perla PT [facility]   6/23/2022  2:45 PM Zoë Hussein, PT [facility]   6/23/2022  3:30 PM MIGUEL Noble [facility]   6/28/2022 11:00 AM MIGUEL Noble [facility]   6/28/2022 11:45 AM Zoë Hussein, PT [facility]   6/30/2022  1:15 PM MIGUEL Noble [facility]   6/30/2022  2:00 PM Anant Lopez [facility]

## 2022-06-09 NOTE — PROGRESS NOTES
ST DAILY TREATMENT NOTE    Patient Name: Kendra Alejandro  Date:2022  : 1980  [x]  Patient  Verified  Payor: Payor: Valente Shankar / Plan: [employer] / Product Type: Managed Care Medicare /   In time: 2:05 Out time: 2:45  Total Treatment Time (min): 40  Visit #: 1 of 8  *PN due 22*    Treatment Diagnosis: Dysphagia, oral phase [R13.11]  Chewing difficulty [K08.89]    SUBJECTIVE  Pain Level (0-10 scale): 6 entire body  Any medication changes, allergies to medications, adverse drug reactions, diagnosis change, or new procedure performed?: [x] No    [] Yes (see summary sheet for update)    Subjective functional status/changes:   [x] No changes reported  Pt reports she been doing  A lot better with well her chewing/ swallowing. OBJECTIVE  Treatment provided includes:  Increase/Improve:  []  Voice Quality [x]  Cognitive Linguistic Skills []  Laryngeal/Pharyngeal Exercises   []  Vocal Loudness []  Reading Comprehension [x]  Swallowing Skills    []  Vocal Cord Function []  Auditory Comprehension [x]  Oral Motor Skills   []  Resonance []  Writing Skills [x]  Compensatory strategies    [x]  Speech Intelligibility []  Expressive Language []  Attention   []  Breath Support/Coord.  []  Receptive language [x]  Memory   []  Articulation []  Safety Awareness [x] pt education    []  Fluency []  Word Retrieval []        Treatment Provided:  -pt education  -comp strategies  -speech intelligibility with comp strategies  - STM/ immediate recall  -naturalness of speech       Patient/Caregiver  Education: [x] Review HEP      HEP/Handouts given: comp strategies for speech intelligibility    Pain Level (0-10 scale) post treatment: 0    ASSESSMENT   []   Improving appropriately and progressing toward goals  [x]   Improving slowly and progressing toward goals  []   Approximating goals/maximum potential  [x]   Continues to benefit from skilled therapy to address remaining functional deficits  []   Not progressing toward goals and plan of care will be adjusted    Patient will continue to benefit from skilled therapy to address remaining functional deficits: cognitive-linguistics, motor speech, swallowing     Progress towards goals / Updated goals:  1. Pt will recall/demo aspiration precautions and safe swallowing strategies with 100% acc with amandeep from visual aid in 3/3 sessions (Sit upright at 90 degree angle during po trials and for 30 minute post po intake; small sips/bites; effortful swallow; alternate liquids/solids; slow rate; oral care 2-3x daily) to decrease the reported incidences of dysphagia and reduce risk of aspiration.     Current: 6/9/22: Pt recalled comp strategies 100% acc QUINN and I demo'd with 100% acc amandeep for not speaking with food in mouth. 2. Pt will tolerate trials of regular, puree, mixed consistency, and thin liquids trials  using comp strategies without s/sx of aspiration/ penetration given amandeep with visual aid A in 3/3 sessions.    Current: 6/9/22: Mixed consistency and thin liquids: 100% acc without any s/sx of aspiration/penentration     3. Pt will demonstrate a more coordinate and efficient rotary chew with adequate bolus formation/manipulation, and complete oral clearance in 3/3 sessions when provided with amandeep with visual aid to promote safe po intake and QOL.      Current: 6/9/22: functional mastication with adequate bolus formation/manipulation and complete oral clearance. Speech Goals:  1)  Pt will recall x3 comp strategies and apply them as deemed appropriate in order to complete delayed recall tasks (novel information and 3-4+ items) presented verbally/visually given a  5-10 minute delay with 90% acc given min cues to improve STM for safety and independence.   Current: 6/9/22: Pt able to recall x3/4 comp strategies and re educ on the 4th.    4 words given x10 min delay with 75% acc QUINN increasing to 100% acc given modA     2)  Pt will completed immediate recall tasks (words 3-5, digits3-5, novel recall 2-4 sentences, etc)  with 80% acc given min cues and use of comp strategies in various structured tasks to improve safety, independence and communication competence.   July Will: 6/9/22: Not targeted this date    3) Pt will complete mental flexibility tasks with 90% acc given Rigoberto/redirection to enhance attention/executive functioning.   Current: 6/9/22: 80% acc given min-mod cues/ reps. 4) Pt will complete various oral motor exercises (lingual elevation, lingual press, linugal circles jaw strengthening),coordination tasks (specifically:, lingual elevation/depression x5 within 6 secs, and tongue placement coordination (ka-la) x10 within 5 secs), and tongue base retraction ex  to improve oral strength/ ROM/coordination for speech/swallowing   Current:6/9/22: (a) lingual elevation/depression x5 within: 13.3 secs (tongue noted to be deviating to the R- reduced coordination after 3rd rep)  (b) lingual alternate (ka-la) x10 within 4.3 secs      5) Pt will recall and demo comp strategies (increase intensity, slow down, rate, and over-articulate) in 90% acc when presented with Rigoberto to improve speech intelligibility.   Current: 6/9/22: Not targeted this date. 6) Pt will demonstrate ability to produce improve prosody, rhyme, rhythm in structured sentences, word emphasis tasks, and varying pitch phrases using various comp strategies with 80% acc given min cues in 4/4 opportunities to improve naturalness of speech.    Current: 6/9/22: 90% acc given Rigoberto/reps     PLAN  []  Continue plan of care  [x]  Modify Goals/Treatment Plan      []  Discharge due to:  [] Other:    Swallow Tx: 15  Speech Tx: [facility], SLP 6/9/2022  3:39 PM  MA, [address] Pathologist    Future Appointments   Date Time Provider Tawny Shelton   6/9/2022  2:45 PM Laurie Zaidi, PT [facility]   6/14/2022  1:15 PM MIGUEL Bonilla [facility]   6/14/2022  2:00 PM Suellen Gan, Trista Diaz [facility]   6/16/2022 11:00 AM Grace Chanel, SLP [facility]   6/16/2022 11:45 AM Jewell Pitt, PT [facility]   6/21/2022  2:45 PM Grace Chanel, SLP [facility]   6/21/2022  3:30 PM Andrew Pathak, PT [facility]   6/23/2022  2:45 PM Jewell Pitt, PT [facility]   6/23/2022  3:30 PM Grace Chanel, SLP [facility]   6/28/2022 11:00 AM MIGUEL Brush [facility]   6/28/2022 11:45 AM Jewell Pitt, PT [facility]   6/30/2022  1:15 PM Grace Chanel, SLP [facility]   6/30/2022  2:00 PM Ambika Abdul [facility]

## 2022-06-10 NOTE — PROGRESS NOTES
[facility] - [address]  Ph: [phone]  Fax: [phone]    Speech Therapy Physician Update- Medicaid  [x] Progress Note  [] Discharge Summary  Patient name: Rosalba Wu Start of Care: 22   Referral source: Nadja Giordano NP : 1980   Medical/Treatment Diagnosis: Dysphagia, oral phase [R13.11]  Chewing difficulty [K08.89] Onset Date:referred 22     Prior Hospitalization: see medical history Provider#: [id number]   Medications: Verified on Patient Summary List    Comorbidities: Central Pain Syndrome; MS; chronic pain syndrome; neuralgia; neurogenic bladder; multiple joint pain; GERD; idiopathic hypotension; major depressive disorder; visyual disturbances; metabolic acidosis; orthostatic dizziness; intermittent lightheadedness; syncope; starvation ketoacidosis; diverticular dieases of colon; gastroparesis; Fe def anemia; oropharyngeal dysphagia; flatulence; constipation    Prior Level of Function: All aspects of speech/ language, cognition, voice and swallowing WNLs, per pt report; per chart review: noted previous MBSS  Visits from Start of Care: 5    Missed Visits: 3    Status at Evaluation/Last Progress Note: The Brief Cognitive Assessment Tool (BCAT) was administered. The instrument assesses orientation, verbal recall, visual recognition, visual recall, attention, abstraction, language, executive functions, visuospacial processing in adults and older adult population. Results of the sub-tests: scored as correct/total possible.      Orientation: Patient was oriented to person, month, day of week, city, state, and situation: 6  Immediate verbal memory: 4/4 words; delayed verbal memory: 0/4 words  (+ 2 given modA)    Visual recognition/naming 3 common objects: 3/3; delayed visual recall 3/3.    Attention: tap when he heard a certain letter called out:   Mental control: count backwards 20-1: / Days of week backwards:   digits forward ; digits backwards:   Abstraction: tell the similarities: 3/3   Language: repeat a sentence   Fluency: list girls names:   Executive: cognitive shiftin/2  Arithmetic reasonin/1  Judgement: (how much time to a lot to get to an appointment):   Visuospatial; design:  Clock   Immediate story recall:  facts recalled:  Delayed story recall: 3/11 0/2  Story recognition: 3/5     Patient attained a score of 39 points out of a total of 50, indicating mild-moderate cognitive deficits with immediate and delayed recall, mental flexibility.  -Overall her delayed recall was mod-severely impaired.  -Immediate memory wasmild-moderately impaired  -mental flexibility was mildly impaired    Portions of the [place] Dysarthria Assessment 2nd Edition (FDA-2) was administered. Results are scored by a rating scale form from an A (normal function) to an E (no function) by 7 sections, including:  Reflexes, Respiration, lips, Palate, Laryngeal, Tongue, and Intelligibility. Results are as indicated:     Reflexes- Cough A, Swallow B Dribble/Drool A  Respiration: At Rest A , At [facility]  Lips: At Rest  B Spread B, Seal A, Alternate A ( 9.4), In speech B  Palate: Fluids A, Maintenance A, In-Speech A   Laryngeal: Time A (25.9s) , Pitch DNT, Volume  DNT,  In speech B-C  Tongue: At rest: B, Protrusion: A (3.4s), Elevation: C 12.5 with reps , Lateral: 3.3s A, Alternate: B 6.9s , In Speech: B   Intelligibility: Words: A-B, Sentences: B, Conversation: B     A= Normal for age, B = Mild abnormality noticeable to skilled observer, C= Abnormality obvious but can perform task/movements with reasonable approximation, D= Some production of task but poor in quality, unable to sustain, inaccurate or extremely labored, E= Unable to undertake/ movement/ sound  Overall pt was ~ 80-90% intelligible, however did intermittently exhibited garbles/slurred speech with noted distortion and interrupted rhythm, rhyme, prosody. Speech was often rapid in natrure. Her VQ intermittently fell to a back throat focus. She displayed significantly reduced coordination with lingual elevation/depression and overall reduced strength for lingual elevation   Impression: Mild-moderate cognitive linguistic deficits and Mild dysarthria         Progress towards Goals:   Swallow:  1. Pt will recall/demo aspiration precautions and safe swallowing strategies with 100% acc with amandeep from visual aid in 3/3 sessions (Sit upright at 90 degree angle during po trials and for 30 minute post po intake; small sips/bites; effortful swallow; alternate liquids/solids; slow rate; oral care 2-3x daily) to decrease the reported incidences of dysphagia and reduce risk of aspiration. Current: Goal MET; d/c goal      2. Pt will tolerate trials of regular, puree, mixed consistency, and thin liquids trials  using comp strategies without s/sx of aspiration/ penetration given amandeep with visual aid A in 3/3 sessions. Current: Goal MET; d/c goal      3. Pt will demonstrate a more coordinate and efficient rotary chew with adequate bolus formation/manipulation, and complete oral clearance in 3/3 sessions when provided with amandeep with visual aid to promote safe po intake and QOL.    Current: Goal MET; d/c goal     Speech:  1)  Pt will recall x3 comp strategies and apply them as deemed appropriate in order to complete delayed recall tasks (novel information and 3-4+ items) presented verbally/visually given a  5-10 minute delay with 90% acc given min cues to improve STM for safety and independence. Current: Pt able to recall x 2/4 comp strategies; Recall x3 words with self implemented picture it and association: following 5 minute delay: 100% acc given Rigoberto;  Novel information given 5 min delay: 70% acc given modA- progressing/advance goal    2)  Pt will completed immediate recall tasks (words 3-5, digits3-5, novel recall 2-4 sentences, etc)  with 80% acc given min cues and use of comp strategies in various structured tasks to improve safety, independence and communication competence.   Current: Recalling elements from novel information: 50% acc Pedro Pablo and increasing 75% acc given min-mod cues - progressing/advance goal    3) Pt will complete mental flexibility tasks with 90% acc given Rigoberto/redirection to enhance attention/executive functioning. 89% acc Pedro Pablo and increasing to 100% acc given Rigoberto/redirection x1 session- progressing/ modify goal    4) Pt will complete various oral motor exercises (lingual elevation, lingual press, linugal circles jaw strengthening),coordination tasks (specifically:, lingual elevation/depression x5 within 6 secs, and tongue placement coordination (ka-la) x10 within 5 secs), and tongue base retraction ex  to improve oral strength/ ROM/coordination for speech/swallowing. Current: Jaw x15 holding for 5 seconds biting (7 tongue depressors); lingual elevation x15 holding x5 sec each; Mod skilled instruction/teaching to implement Rachel maneuver: x1/8 trials successful     (a) lingual elevation/depression x5 within: 13.3 secs (tongue noted to be deviating to the R- reduced coordination after 3rd rep)  (b) lingual alternate (ka-la) x10 within 4.3 secs- progressing/ modify goal      5) Pt will recall and demo comp strategies (increase intensity, slow down, rate, and over-articulate) in 90% acc when presented with Rgioberto to improve speech intelligibility. Re-educated with mod skilled instruction to comp strategies. Oral readin% acc and Conversation: 80% acc - progressing/ approaching/ continue addressing      6) Pt will demonstrate ability to produce improve prosody, rhyme, rhythm in structured sentences, word emphasis tasks, and varying pitch phrases using various comp strategies with 80% acc given min cues in 4/4 opportunities to improve naturalness of speech.    Current: 90% acc given Rigoberto/reps- goal MET d/c      Goals: to be achieved in 4 weeks:  1)  Pt will recall x4 comp strategies and apply them as deemed appropriate in order to complete delayed recall tasks (novel information and 4 items) presented verbally/visually given a  10-15 minute delay with 90% acc given min cues in 3/3 sessions to improve STM for safety and independence. 2) Pt will completed immediate recall tasks (words 5, digits 5, novel recall 4+ sentences, etc)  with 90% acc given min cues in 3/3 sessions and use of comp strategies in various structured tasks to improve safety, independence and communication competence.    3) Pt will complete mental flexibility tasks with 90% acc given Rigoberto/redirection in 3/3 sessions to enhance attention/executive functioning. 4) Pt will complete various oral motor exercises (lingual elevation, lingual press, linugal circles), coordination tasks (specifically:lingual elevation/depression x5 within 6 secs,), to improve oral strength/ ROM/coordination for speech.     Long Term Goals: To be accomplished in 4 weeks   1. Patient will consume a least restrictive diet utilizing compensatory swallowing strategies with amandeep in 4/5 trials without s/s of aspiration/ penetration to promote QOL, enhance hydration/ nutrition status, and reduce risk of aspiration. Goal MET; d/c goal      2)  Patient will develop improved functional cognitive-linguistic based communication skills with use of comp strategies to communicate wants/ needs effectively, maintain safety, and participate in social activities of choice with  90% acc with amandeep as supported by BCAT reassessment. -progressing/continue address     3) Patient will develop functional and intelligible speech with use of compensatory strategies through the use of adequate labial and lingual function, adequate intensity, increased articulatory precision, and speech prosody to >90% intelligibility at conversational level. -progressing/continue address     ASSESSMENT: Pt is exhibiting functional gains.  All goals have been reviewed and modified accordingly. Continue POC as indicated. ASSESSMENT/RECOMMENDATIONS:  [x]Continue therapy per initial plan/protocol at a frequency of  2 x per week for 4 weeks  []Continue therapy with the following recommended changes:_____________________      _____________________________________________________________________  []Discontinue therapy progressing towards or have reached established goals  []Discontinue therapy due to lack of appreciable progress towards goals  []Discontinue therapy due to lack of attendance or compliance  []Await Physician's recommendations/decisions regarding therapy  []Other:________________________________________________________________    Thank you for this referral.   Ashlie Argueta, SLP 6/10/2022 2:28 PM  MA, CCC-SLP  Speech-Language Pathologist              NOTE TO PHYSICIAN:  [address]   FAX TO [facility]: [phone]  If you are unable to process this request in 24 hours please contact our office:   [phone]    []  I have read the above report and request that my patient continue as recommended. []  I have read the above report and request that my patient continue therapy with the following changes/special instructions:________________________________________  []I have read the above report and request that my patient be discharged from therapy.     Physicians signature: ______________________Date: _______Time:__________

## 2022-06-14 ENCOUNTER — HOSPITAL ENCOUNTER (OUTPATIENT)
Dept: PHYSICAL THERAPY | Age: 42
Discharge: HOME | End: 2022-06-14
Payer: MEDICARE

## 2022-06-14 PROCEDURE — 92507 TX SP LANG VOICE COMM INDIV: CPT

## 2022-06-14 PROCEDURE — 97110 THERAPEUTIC EXERCISES: CPT

## 2022-06-14 PROCEDURE — 97116 GAIT TRAINING THERAPY: CPT

## 2022-06-14 PROCEDURE — 97530 THERAPEUTIC ACTIVITIES: CPT

## 2022-06-14 NOTE — PROGRESS NOTES
PHYSICAL THERAPY - DAILY TREATMENT NOTE    Patient Name: Maureen Payne        Date: 2022  : 1980   YES Patient  Verified  Visit #:   7     Insurance: Payor: Shagufta Mendoza / Plan: [employer] / Product Type: Managed Care Medicare /      In time: 2:05 Out time: 2:44   Total Treatment Time: 39     Medicare/[employer] [place] Time Tracking (below)   Total Timed Codes (min):  39 1:1 Treatment Time:  39     TREATMENT AREA =  Gait instability [R26.81]  Multiple sclerosis [G35]    SUBJECTIVE    Pain Level (on 0 to 10 scale):  3-4  / 10 body sore    Medication Changes/New allergies or changes in medical history, any new surgeries or procedures? NO    If yes, update Summary List   Subjective Functional Status/Changes:  []  No changes reported     Pt states having a little pain today, just took a Tramadol and hopes that will kick in soon. Pt requests to not do a lot of strenuous exercise today since she is having some pain. OBJECTIVE    17 min Therapeutic Exercise:  [x]  See flow sheet   Rationale:    increase ROM, increase strength, improve coordination, improve balance and increase proprioception to promote increased functional mobility and increased activity tolerance with ADL's.    22 min Therapeutic Activity: see flow sheet   high knee, retro and side stepping gait    Rationale:    increase ROM, increase strength, improve coordination, improve balance and increase proprioception to promote increased functional mobility and increased activity tolerance with ADL's.     min Patient Education:  YES  Reviewed HEP   []  Progressed/Changed HEP based on: Other Objective/Functional Measures:    Progressed hamstring and piriformis stretch to sitting with good tolerance. Pt initially with lateral LOB during high knee gait, cued pt to decrease LE movement and focus on trunk and pelvic stability. Pt with improved form, required SBA to CGA for safety with balance.      Pt reports body pain resolved after taking pain medication. Post Treatment Pain Level (on 0 to 10) scale:   0  / 10     ASSESSMENT    Assessment/Changes in Function:     Pt with lateral LOB during dynamic gait activity. []  See Progress Note/Recertification   Patient will continue to benefit from skilled PT services to modify and progress therapeutic interventions, address functional mobility deficits, address strength deficits, analyze and cue movement patterns, assess and modify postural abnormalities, address imbalance/dizziness, and instruct in home and community integration to attain remaining goals. Progress toward goals / Updated goals:    1.  Patient will be independent with home exercise program.  2.  Increase FGA score to 23/30 to decrease fall risk. dynamic gait ex   3.  Increase Dodson score to 45/56 to decrease fall risk.      PLAN    []  Upgrade activities as tolerated YES Continue plan of care   []  Discharge due to :    []  Other:      Therapist: Иван Bernal PTA    Date: 6/14/2022 Time: 2:17 PM     Future Appointments   Date Time Provider Tawny Shelton   6/16/2022 11:00 AM MIGUEL Duncan [facility]   6/16/2022 11:45 AM Ángel Morton, PT [facility]   6/21/2022  2:45 PM MIGUEL Duncan [facility]   6/21/2022  3:30 PM Karen Stone PT [facility]   6/23/2022  2:45 PM Ángel Morton PT [facility]   6/23/2022  3:30 PM MIGUEL Duncan [facility]   6/28/2022 11:00 AM MIGUEL Duncan [facility]   6/28/2022 11:45 AM Ángel Morton, PT [facility]   6/30/2022  1:15 PM MIGUEL Duncan [facility]   6/30/2022  2:00 PM Natalio [facility]

## 2022-06-14 NOTE — PROGRESS NOTES
ST DAILY TREATMENT NOTE    Patient Name: Kaykay Lainez  Date:2022  : 1980  [x]  Patient  Verified  Payor: Payor: Bunny Flores / Plan: [employer] / Product Type: Managed Care Medicare /   In time:  Out time: 2:00  Total Treatment Time (min): 38  Visit #: 2 of 8  *PN due 22*    Treatment Diagnosis: Dysphagia, oral phase [R13.11]  Chewing difficulty [K08.89]    SUBJECTIVE  Pain Level (0-10 scale):0  Any medication changes, allergies to medications, adverse drug reactions, diagnosis change, or new procedure performed?: [x] No    [] Yes (see summary sheet for update)    Subjective functional status/changes:   [x] No changes reported    OBJECTIVE  Treatment provided includes:  Increase/Improve:  []  Voice Quality [x]  Cognitive Linguistic Skills []  Laryngeal/Pharyngeal Exercises   []  Vocal Loudness []  Reading Comprehension [x]  Swallowing Skills    []  Vocal Cord Function []  Auditory Comprehension [x]  Oral Motor Skills   []  Resonance []  Writing Skills [x]  Compensatory strategies    [x]  Speech Intelligibility []  Expressive Language []  Attention   []  Breath Support/Coord.  []  Receptive language [x]  Memory   []  Articulation []  Safety Awareness [x] pt education    []  Fluency []  Word Retrieval []        Treatment Provided:  -pt education  -comp strategies  -mental flexibility  - STM/ immediate recall    Patient/Caregiver  Education: [x] Review HEP      HEP/Handouts given: n/a    Pain Level (0-10 scale) post treatment: 0    ASSESSMENT   []   Improving appropriately and progressing toward goals  [x]   Improving slowly and progressing toward goals  []   Approximating goals/maximum potential  [x]   Continues to benefit from skilled therapy to address remaining functional deficits  []   Not progressing toward goals and plan of care will be adjusted    Patient will continue to benefit from skilled therapy to address remaining functional deficits: cognitive-linguistics, motor speech, swallowing     Progress towards goals / Updated goals:  1)  Pt will recall x4 comp strategies and apply them as deemed appropriate in order to complete delayed recall tasks (novel information and 4 items) presented verbally/visually given a  10-15 minute delay with 90% acc given min cues in 3/3 sessions to improve STM for safety and independence. Current 6/14/22: recall x3/4 comp strategies  (a) x4 word recall with 10 minutes: 100% acc given Rigoberto  (b) Novel: 40% acc Pedro Pablo and increasing to 50% acc given Rigoberto. 2) Pt will completed immediate recall tasks (words 5, digits 5, novel recall 4+ sentences, etc)  with 90% acc given min cues in 3/3 sessions and use of comp strategies in various structured tasks to improve safety, independence and communication competence. Current 6/14/22:  40% acc Pedro Pablo and increasing to 50% acc given Rigoberto. 3) Pt will complete mental flexibility tasks with 90% acc given Rigoberto/redirection in 3/3 sessions to enhance attention/executive functioning. Current 6/14/22:  64% acc PEDRO PABLO and increasing 90% acc given modA/redirection     4) Pt will complete various oral motor exercises (lingual elevation, lingual press, linugal circles), coordination tasks (specifically:lingual elevation/depression x5 within 6 secs,), to improve oral strength/ ROM/coordination for speech. Current 6/14/22:  Not targeted this date.      PLAN  [x]  Continue plan of care  []  Modify Goals/Treatment Plan      []  Discharge due to:  [] Other:      MIGUEL Osullivan 6/14/2022  3:39 PM  MA, [address] Pathologist    Future Appointments   Date Time Provider Tawny Shelton   6/14/2022  1:15 PM MIGUEL Oenal [facility]   6/14/2022  2:00 PM Omar Francisco [facility]   6/16/2022 11:00 AM MIGUEL Oneal [facility]   6/16/2022 11:45 AM Everett Watson PT [facility]   6/21/2022  2:45 PM Bright Coffey, SLP [facility]   6/21/2022  3:30 PM Lorraine Salinas, PT [facility]   6/23/2022  2:45 PM Everett Watson, PT [facility]   6/23/2022  3:30 PM MIGUEL Oneal [facility]   6/28/2022 11:00 AM MIGUEL Oneal [facility]   6/28/2022 11:45 AM Everett Antis, PT [facility]   6/30/2022  1:15 PM MIGUEL Oneal [facility]   6/30/2022  2:00 PM Omar Francisco [facility]

## 2022-06-16 ENCOUNTER — APPOINTMENT (OUTPATIENT)
Dept: PHYSICAL THERAPY | Age: 42
End: 2022-06-16
Payer: MEDICARE

## 2022-06-21 ENCOUNTER — HOSPITAL ENCOUNTER (OUTPATIENT)
Dept: PHYSICAL THERAPY | Age: 42
End: 2022-06-21
Payer: MEDICARE

## 2022-06-21 ENCOUNTER — APPOINTMENT (OUTPATIENT)
Dept: PHYSICAL THERAPY | Age: 42
End: 2022-06-21
Payer: MEDICARE

## 2022-06-23 ENCOUNTER — HOSPITAL ENCOUNTER (OUTPATIENT)
Dept: PHYSICAL THERAPY | Age: 42
End: 2022-06-23
Payer: MEDICARE

## 2022-06-23 ENCOUNTER — APPOINTMENT (OUTPATIENT)
Dept: PHYSICAL THERAPY | Age: 42
End: 2022-06-23
Payer: MEDICARE

## 2022-06-28 ENCOUNTER — APPOINTMENT (OUTPATIENT)
Dept: PHYSICAL THERAPY | Age: 42
End: 2022-06-28
Payer: MEDICARE

## 2022-06-30 ENCOUNTER — APPOINTMENT (OUTPATIENT)
Dept: PHYSICAL THERAPY | Age: 42
End: 2022-06-30
Payer: MEDICARE

## 2022-07-20 NOTE — PROGRESS NOTES
[facility] - [facility]  [address]  Ph: [phone]  Fax: [phone]    Speech Therapy Discharge Summary-Medicaid    Patient name: Jerod Hyde Start of Care: 22   Referral source: Brenda Wolfe NP : 1980   Medical/Treatment Diagnosis: Dysphagia, oral phase [R13.11]  Chewing difficulty [K08.89] Onset Date:22     Prior Hospitalization: see medical history Provider#: [id number]   Medications: Verified on Patient Summary List    Comorbidities: Central Pain Syndrome; MS; chronic pain syndrome; neuralgia; neurogenic bladder; multiple joint pain; GERD; idiopathic hypotension; major depressive disorder; visyual disturbances; metabolic acidosis; orthostatic dizziness; intermittent lightheadedness; syncope; starvation ketoacidosis; diverticular dieases of colon; gastroparesis; Fe def anemia; oropharyngeal dysphagia; flatulence; constipation     Prior Level of Function: All aspects of speech/ language, cognition, voice and swallowing WNLs, per pt report; per chart review: noted previous MBSS    Visits from Start of Care: 7   Missed Visits: 5    Reporting Period : 22 to 22    Summary of Care:  Goal: 1)  Pt will recall x4 comp strategies and apply them as deemed appropriate in order to complete delayed recall tasks (novel information and 4 items) presented verbally/visually given a  10-15 minute delay with 90% acc given min cues in 3/3 sessions to improve STM for safety and independence. Status at last note/certification: 0/59/10: recall x3/4 comp strategies  (a) x4 word recall with 10 minutes: 100% acc given Rigoberto  (b) Novel: 40% acc Pedro Pablo and increasing to 50% acc given Rigoberto. Status at discharge: unable to assess- pt abdicated.     Goal: 2) Pt will completed immediate recall tasks (words 5, digits 5, novel recall 4+ sentences, etc)  with 90% acc given min cues in 3/3 sessions and use of comp strategies in various structured tasks to improve safety, independence and communication  Status at last note/certification: 1/30/66:  40% acc Quinn and increasing to 50% acc given Rigoberto. Status at discharge: unable to assess- pt abdicated. Goal: 3) Pt will complete mental flexibility tasks with 90% acc given Rigoberto/redirection in 3/3 sessions to enhance attention/executive functioning. Status at last note/certification:6/14/22:  64% acc QUINN and increasing 90% acc given modA/redirection   Status at discharge: unable to assess- pt abdicated. Goal: 4) Pt will complete various oral motor exercises (lingual elevation, lingual press, linugal circles), coordination tasks (specifically:lingual elevation/depression x5 within 6 secs,), to improve oral strength/ ROM/coordination for speech. Status at last note/certification:6/9/22: (a) lingual elevation/depression x5 within: 13.3 secs (tongue noted to be deviating to the R- reduced coordination after 3rd rep)  (b) lingual alternate (ka-la) x10 within 4.3 secs  Status at discharge: unable to assess- pt abdicated. ASSESSMENT:   Pt not seen since 6/14/22. X2 calls made to schedule f/u's . No return call. Will d/c at this time. Thank you for this referral.    RECOMMENDATIONS:  [x]Discontinue therapy: []Patient has reached or is progressing toward set goals      [x]Patient is non-compliant or has abdicated      []Due to lack of appreciable progress towards set goals    David Scales, SLP 7/20/2022 12:11 PM  MA, [facility]-SLP  Speech-Language Pathologist      NOTE TO PHYSICIAN:  Please complete the following and fax to: [facility] at [facility] at [phone]    Retain this original for your records. If you are unable to process this request in 24 hours, please contact our office.      [] I have read the above report and request that my patient continue therapy with the following changes/special instructions:  [] I have read the above report and request that my patient be discharged from therapy    Physician's Signature:_____________________ Date:___________Time:__________

## 2022-08-03 NOTE — PROGRESS NOTES
[address] [place] [address] PHYSICAL THERAPY  [address] [facility] [address], [address] - Phone: [phone]  Fax: [phone]  DISCHARGE SUMMARY FOR PHYSICAL THERAPY          Patient Name: Diaz Valente : 1980   Treatment/Medical Diagnosis: Gait instability [R26.81]  Multiple sclerosis [G35]   Onset Date: chronic      Referral Source: Kim iWse NP Start of Care [facility]): 22   Prior Hospitalization: See Medical History Provider #: [id number]   Prior Level of Function: Ambulating with SPC   Comorbidities: Anxiety, depression, back pain, IBS, MS, insomnia, migraines   Medications: Verified on Patient Summary List   [facility] 7 Missed Visits: 6       Key Functional Changes/Progress: Pt was last seen for Physical Therapy 22 and then cancelled her last couple of scheduled appointments. Pt was contacted regarding continued therapy and reported she is unable to attend therapy at this time secondary to her son being out of school. Assessments/Recommendations: Other: Discharging patient at this time, pt defers treatment at this time, not able to attend at this time. If you have any questions/comments please contact us directly at [phone]. Thank you for allowing us to assist in the care of your patient. PTA Signature: Therapist Signature  Preston Owens MPT Date: 8/3/22   Reporting Period: 22-22 Time: 9:15 AM     NOTE TO PHYSICIAN:  PLEASE COMPLETE THE ORDERS BELOW AND FAX TO   [facility]: [phone]. If you are unable to process this request in 24 hours please contact our office: [phone].    ___ I have read the above report and request that my patient be discharged from therapy.      Physician Signature:        Date:______Time:                                            Kim Wise NP